# Patient Record
Sex: FEMALE | Race: WHITE | NOT HISPANIC OR LATINO | Employment: FULL TIME | URBAN - METROPOLITAN AREA
[De-identification: names, ages, dates, MRNs, and addresses within clinical notes are randomized per-mention and may not be internally consistent; named-entity substitution may affect disease eponyms.]

---

## 2023-10-26 ENCOUNTER — OFFICE VISIT (OUTPATIENT)
Dept: FAMILY MEDICINE CLINIC | Facility: CLINIC | Age: 63
End: 2023-10-26
Payer: COMMERCIAL

## 2023-10-26 VITALS
WEIGHT: 142 LBS | RESPIRATION RATE: 18 BRPM | BODY MASS INDEX: 22.29 KG/M2 | TEMPERATURE: 99.1 F | HEIGHT: 67 IN | HEART RATE: 89 BPM | OXYGEN SATURATION: 100 % | DIASTOLIC BLOOD PRESSURE: 72 MMHG | SYSTOLIC BLOOD PRESSURE: 152 MMHG

## 2023-10-26 DIAGNOSIS — Z13.29 SCREENING FOR THYROID DISORDER: ICD-10-CM

## 2023-10-26 DIAGNOSIS — I10 ESSENTIAL HYPERTENSION: ICD-10-CM

## 2023-10-26 DIAGNOSIS — R60.0 LOWER EXTREMITY EDEMA: ICD-10-CM

## 2023-10-26 DIAGNOSIS — Z13.1 SCREENING FOR DIABETES MELLITUS: ICD-10-CM

## 2023-10-26 DIAGNOSIS — Z13.0 SCREENING FOR DEFICIENCY ANEMIA: ICD-10-CM

## 2023-10-26 DIAGNOSIS — Z23 ENCOUNTER FOR IMMUNIZATION: ICD-10-CM

## 2023-10-26 DIAGNOSIS — Z12.31 ENCOUNTER FOR SCREENING MAMMOGRAM FOR MALIGNANT NEOPLASM OF BREAST: ICD-10-CM

## 2023-10-26 DIAGNOSIS — Z00.00 ANNUAL PHYSICAL EXAM: Primary | ICD-10-CM

## 2023-10-26 DIAGNOSIS — Z13.6 SCREENING FOR CARDIOVASCULAR CONDITION: ICD-10-CM

## 2023-10-26 DIAGNOSIS — H61.91 SKIN LESION OF RIGHT EAR: ICD-10-CM

## 2023-10-26 PROCEDURE — 90715 TDAP VACCINE 7 YRS/> IM: CPT

## 2023-10-26 PROCEDURE — 90471 IMMUNIZATION ADMIN: CPT

## 2023-10-26 PROCEDURE — 99214 OFFICE O/P EST MOD 30 MIN: CPT | Performed by: FAMILY MEDICINE

## 2023-10-26 PROCEDURE — 99386 PREV VISIT NEW AGE 40-64: CPT | Performed by: FAMILY MEDICINE

## 2023-10-26 RX ORDER — TRIAMCINOLONE ACETONIDE 1 MG/G
CREAM TOPICAL 2 TIMES DAILY
Qty: 30 G | Refills: 1 | Status: SHIPPED | OUTPATIENT
Start: 2023-10-26

## 2023-10-26 RX ORDER — TRIAMTERENE AND HYDROCHLOROTHIAZIDE 37.5; 25 MG/1; MG/1
1 CAPSULE ORAL DAILY
Qty: 30 CAPSULE | Refills: 5 | Status: SHIPPED | OUTPATIENT
Start: 2023-10-26 | End: 2024-04-23

## 2023-10-26 NOTE — PATIENT INSTRUCTIONS
Wellness Visit for Adults   AMBULATORY CARE:   A wellness visit  is when you see your healthcare provider to get screened for health problems. Your healthcare provider will also give you advice on how to stay healthy. Write down your questions so you remember to ask them. Ask your healthcare provider how often you should have a wellness visit. What happens at a wellness visit:  Your healthcare provider will ask about your health, and your family history of health problems. This includes high blood pressure, heart disease, and cancer. He or she will ask if you have symptoms that concern you, if you smoke, and about your mood. You may also be asked about your intake of medicines, supplements, food, and alcohol. Any of the following may be done: Your weight  will be checked. Your height may also be checked so your body mass index (BMI) can be calculated. Your BMI shows if you are at a healthy weight. Your blood pressure  and heart rate will be checked. Your temperature may also be checked. Blood and urine tests  may be done. Blood tests may be done to check your cholesterol levels. Abnormal cholesterol levels increase your risk for heart disease and stroke. You may also need a blood or urine test to check for diabetes if you are at increased risk. Urine tests may be done to look for signs of an infection or kidney disease. A physical exam  includes checking your heartbeat and lungs with a stethoscope. Your healthcare provider may also check your skin to look for sun damage. Screening tests  may be recommended. A screening test is done to check for diseases that may not cause symptoms. The screening tests you may need depend on your age, gender, family history, and lifestyle habits. For example, colorectal screening may be recommended if you are 48years old or older. Screening tests you need if you are a woman:   A Pap smear  is used to screen for cervical cancer.  Pap smears are usually done every 3 to 5 years depending on your age. You may need them more often if you have had abnormal Pap smear test results in the past. Ask your healthcare provider how often you should have a Pap smear. A mammogram  is an x-ray of your breasts to screen for breast cancer. Experts recommend mammograms every 2 years starting at age 48 years. You may need a mammogram at age 52 years or younger if you have an increased risk for breast cancer. Talk to your healthcare provider about when you should start having mammograms and how often you need them. Vaccines you may need:   Get an influenza vaccine  every year. The influenza vaccine protects you from the flu. Several types of viruses cause the flu. The viruses change over time, so new vaccines are made each year. Get a tetanus-diphtheria (Td) booster vaccine  every 10 years. This vaccine protects you against tetanus and diphtheria. Tetanus is a severe infection that may cause painful muscle spasms and lockjaw. Diphtheria is a severe bacterial infection that causes a thick covering in the back of your mouth and throat. Get a human papillomavirus (HPV) vaccine  if you are female and aged 23 to 32 or male 23 to 24 and never received it. This vaccine protects you from HPV infection. HPV is the most common infection spread by sexual contact. HPV may also cause vaginal, penile, and anal cancers. Get a pneumococcal vaccine  if you are aged 72 years or older. The pneumococcal vaccine is an injection given to protect you from pneumococcal disease. Pneumococcal disease is an infection caused by pneumococcal bacteria. The infection may cause pneumonia, meningitis, or an ear infection. Get a shingles vaccine  if you are 60 or older, even if you have had shingles before. The shingles vaccine is an injection to protect you from the varicella-zoster virus. This is the same virus that causes chickenpox.  Shingles is a painful rash that develops in people who had chickenpox or have been exposed to the virus. How to eat healthy:  My Plate is a model for planning healthy meals. It shows the types and amounts of foods that should go on your plate. Fruits and vegetables make up about half of your plate, and grains and protein make up the other half. A serving of dairy is included on the side of your plate. The amount of calories and serving sizes you need depends on your age, gender, weight, and height. Examples of healthy foods are listed below:  Eat a variety of vegetables  such as dark green, red, and orange vegetables. You can also include canned vegetables low in sodium (salt) and frozen vegetables without added butter or sauces. Eat a variety of fresh fruits , canned fruit in 100% juice, frozen fruit, and dried fruit. Include whole grains. At least half of the grains you eat should be whole grains. Examples include whole-wheat bread, wheat pasta, brown rice, and whole-grain cereals such as oatmeal.    Eat a variety of protein foods such as seafood (fish and shellfish), lean meat, and poultry without skin (turkey and chicken). Examples of lean meats include pork leg, shoulder, or tenderloin, and beef round, sirloin, tenderloin, and extra lean ground beef. Other protein foods include eggs and egg substitutes, beans, peas, soy products, nuts, and seeds. Choose low-fat dairy products such as skim or 1% milk or low-fat yogurt, cheese, and cottage cheese. Limit unhealthy fats  such as butter, hard margarine, and shortening. Exercise:  Exercise at least 30 minutes per day on most days of the week. Some examples of exercise include walking, biking, dancing, and swimming. You can also fit in more physical activity by taking the stairs instead of the elevator or parking farther away from stores. Include muscle strengthening activities 2 days each week. Regular exercise provides many health benefits.  It helps you manage your weight, and decreases your risk for type 2 diabetes, heart disease, stroke, and high blood pressure. Exercise can also help improve your mood. Ask your healthcare provider about the best exercise plan for you. General health and safety guidelines:   Do not smoke. Nicotine and other chemicals in cigarettes and cigars can cause lung damage. Ask your healthcare provider for information if you currently smoke and need help to quit. E-cigarettes or smokeless tobacco still contain nicotine. Talk to your healthcare provider before you use these products. Limit alcohol. A drink of alcohol is 12 ounces of beer, 5 ounces of wine, or 1½ ounces of liquor. Lose weight, if needed. Being overweight increases your risk of certain health conditions. These include heart disease, high blood pressure, type 2 diabetes, and certain types of cancer. Protect your skin. Do not sunbathe or use tanning beds. Use sunscreen with a SPF 15 or higher. Apply sunscreen at least 15 minutes before you go outside. Reapply sunscreen every 2 hours. Wear protective clothing, hats, and sunglasses when you are outside. Drive safely. Always wear your seatbelt. Make sure everyone in your car wears a seatbelt. A seatbelt can save your life if you are in an accident. Do not use your cell phone when you are driving. This could distract you and cause an accident. Pull over if you need to make a call or send a text message. Practice safe sex. Use latex condoms if are sexually active and have more than one partner. Your healthcare provider may recommend screening tests for sexually transmitted infections (STIs). Wear helmets, lifejackets, and protective gear. Always wear a helmet when you ride a bike or motorcycle, go skiing, or play sports that could cause a head injury. Wear protective equipment when you play sports. Wear a lifejacket when you are on a boat or doing water sports.     © Copyright Yuly Albabraham 2023 Information is for End User's use only and may not be sold, redistributed or otherwise used for commercial purposes. The above information is an  only. It is not intended as medical advice for individual conditions or treatments. Talk to your doctor, nurse or pharmacist before following any medical regimen to see if it is safe and effective for you. It was nice to meet you today Vanna Cole! Please get your labs done fasting as ordered and you will be notified with results. Start the dyazide for your blood pressure. It is taken once daily. This should also help with your swelling as well. Get some compression stockings   Return in 1 month for a blood pressure check, review of labs and pap. Get echo and mammogram done as ordered as well.

## 2023-10-26 NOTE — PROGRESS NOTES
[facility] PRIMARY CARE    NAME: Michelle Carmona  AGE: 58 y.o. SEX: female  : 1960     DATE: 10/26/2023     Assessment and Plan:     Problem List Items Addressed This Visit          Other    Lower extremity edema    Relevant Medications    triamterene-hydrochlorothiazide (DYAZIDE) 37.5-25 mg per capsule    Other Relevant Orders    Urinalysis with microscopic    Echo complete w/ contrast if indicated    Compression Stocking  Discussed all of the potential reasons for lower extremity edema  Suspect that her swelling is most likely due to venous stasis though legs looked good in office today with really no edema. Check echo     Other Visit Diagnoses       Annual physical exam    -  Primary  Discussed diet and exercise. Screening labs ordered  She declines HIV and Hep C screening   Has never had shingrix. Adacel given in office today  She declines flu vaccine. Mammogram ordered  Cervical cancer screen is not up to date. Will schedule here. She states that colon cancer screen is up to date. Had at [facility]. Sent request to care gap team to get result for her chart.      Encounter for screening mammogram for malignant neoplasm of breast        Relevant Orders    Mammo screening bilateral w cad    Screening for cardiovascular condition        Relevant Orders    Lipid panel    Screening for diabetes mellitus        Relevant Orders    Comprehensive metabolic panel    Screening for thyroid disorder        Relevant Orders    TSH, 3rd generation with Free T4 reflex    Screening for deficiency anemia        Relevant Orders    CBC and differential    BMI 22.0-22.9, adult        Encounter for immunization        Relevant Orders    TDAP VACCINE GREATER THAN OR EQUAL TO 8YO IM (Completed)    Essential hypertension        Relevant Medications    triamterene-hydrochlorothiazide (DYAZIDE) 37.5-25 mg per capsule    Other Relevant Orders    Echo complete w/ contrast if indicated  Bp elevated in office today. Was unable to do EKG in office today as our machine is broken. Will get echo to rule out any LVH. Start dyazide once daily  Check labs. Recheck bp in 1 month    Skin lesion of right ear        Relevant Medications    triamcinolone (KENALOG) 0.1 % cream    Other Relevant Orders    Ambulatory Referral to Dermatology  Skin on ear is dry and will give her rx for triamcinolone cream.   Concerned because there is also an area of scabbing that does not seem to be healing so want to be sure this is not some pre-cancerous lesion. Referral to derm placed. Immunizations and preventive care screenings were discussed with patient today. Appropriate education was printed on patient's after visit summary. Counseling:  Alcohol/drug use: discussed moderation in alcohol intake, the recommendations for healthy alcohol use, and avoidance of illicit drug use. Dental Health: discussed importance of regular tooth brushing, flossing, and dental visits. Injury prevention: discussed safety/seat belts, safety helmets, smoke detectors, carbon dioxide detectors, and smoking near bedding or upholstery. Sexual health: discussed sexually transmitted diseases, partner selection, use of condoms, avoidance of unintended pregnancy, and contraceptive alternatives. Exercise: the importance of regular exercise/physical activity was discussed. Recommend exercise 3-5 times per week for at least 30 minutes. Depression Screening and Follow-up Plan: Patient was screened for depression during today's encounter. They screened negative with a PHQ-2 score of 0. Return in about 4 weeks (around 11/23/2023) for Recheck. Chief Complaint:     Chief Complaint   Patient presents with   • Establish Care     Patient is in the office to Establish Care with Dr. Terese Rodriguez. PHQ-9, [place]-7, BMI Plan due.    Past PCP - Dr Anil Agarwal   Stated concerns -Patient Needs labs have not been to doctors in 2 years she also has leg swelling she will like to discuss and might need to see dermo for a rash on her ear   Annual - overdue  PAP -due   Mammo - Due  DXA-Due  [address]   Tdap - Patient will like  to discuss   Flu shot - Patient declines   HEP C / HIV SCREEN -  Lab - [facility]       History of Present Illness:     Adult Annual Physical   Patient here as new patient to this office for a comprehensive physical exam.     She previously saw Dr. Carmen Lovelace at the [facility] office but there are no records of her visits in [facility] so it has likely been years (prior to [facility]'s going on [facility]) that she was there. Is overdue for mammogram.   Cervical cancer screening overdue. No history of abnormal paps. Agrees to schedule. Colon cancer screening was around 5 years ago. Done at [facility]. Immunizations---shingrix, flu, RSV, covid, adacel. Declines flu vaccine. Agreeable to adacel. She has not had shingrix. Wants labs. Has swelling in her legs. Ongoing for years. Worse in summer months. Swelling improves overnight and as the day goes on the swell up. Sister gave her some of her dyazide to try. Wonders if she could have some. She also has dryness of right ear. Had some scabbing. Some pain in right side of abdomen when she bends or moves. Feels "tight". Really only bothers her when she bends over. Diet and Physical Activity  Diet/Nutrition: well balanced diet. Exercise: no formal exercise.       Depression Screening  PHQ-2/9 Depression Screening    Little interest or pleasure in doing things: 0 - not at all  Feeling down, depressed, or hopeless: 0 - not at all  Trouble falling or staying asleep, or sleeping too much: 0 - not at all  Feeling tired or having little energy: 0 - not at all  Poor appetite or overeatin - not at all  Feeling bad about yourself - or that you are a failure or have let yourself or your family down: 0 - not at all  Trouble concentrating on things, such as reading the newspaper or watching television: 0 - not at all  Moving or speaking so slowly that other people could have noticed. Or the opposite - being so fidgety or restless that you have been moving around a lot more than usual: 0 - not at all  Thoughts that you would be better off dead, or of hurting yourself in some way: 0 - not at all  PHQ-2 Score: 0  PHQ-2 Interpretation: Negative depression screen  PHQ-9 Score: 0   PHQ-9 Interpretation: No or Minimal depression        General Health  Sleep: sleeps well. Hearing: decreased - bilateral.  Vision: no vision problems and wears glasses. Sees eye doctor. Dental: regular dental visits. /GYN Health  Patient is: postmenopausal      Advanced Care Planning  Do you have an advanced directive? no  Do you have a durable medical power of ? no     Review of Systems:     Review of Systems   Past Medical History:     History reviewed. No pertinent past medical history. Past Surgical History:     History reviewed. No pertinent surgical history. Social History:     Social History     Socioeconomic History   • Marital status: Single     Spouse name: None   • Number of children: None   • Years of education: None   • Highest education level: None   Occupational History   • None   Tobacco Use   • Smoking status: Former     Packs/day: 0.50     Years: 12.00     Total pack years: 6.00     Types: Cigarettes     Start date: 1977     Quit date: 1989     Years since quittin.3   • Smokeless tobacco: Never   Vaping Use   • Vaping Use: Never used   Substance and Sexual Activity   • Alcohol use:  Yes     Alcohol/week: 14.0 standard drinks of alcohol     Types: 14 Glasses of wine per week   • Drug use: Never   • Sexual activity: Not Currently     Partners: Male   Other Topics Concern   • None   Social History Narrative    Single    One daughter    Works doing [employer]     Social Determinants of Health     Financial Resource Strain: Not on file Food Insecurity: Not on file   Transportation Needs: Not on file   Physical Activity: Not on file   Stress: Not on file   Social Connections: Not on file   Intimate Partner Violence: Not on file   Housing Stability: Not on file      Family History:     Family History   Problem Relation Age of Onset   • Hodgkin's lymphoma Mother    • Heart disease Father    • Thyroid disease Sister       Current Medications:     Current Outpatient Medications   Medication Sig Dispense Refill   • nicotine polacrilex (NICORETTE) 4 mg gum Chew 2 mg as needed for smoking cessation     • triamcinolone (KENALOG) 0.1 % cream Apply topically 2 (two) times a day 30 g 1   • triamterene-hydrochlorothiazide (DYAZIDE) 37.5-25 mg per capsule Take 1 capsule by mouth daily 30 capsule 5     No current facility-administered medications for this visit. Allergies:     No Known Allergies   Physical Exam:     /72   Pulse 89   Temp 99.1 °F (37.3 °C) (Tympanic)   Resp 18   Ht 5' 6.5" (1.689 m)   Wt 64.4 kg (142 lb)   SpO2 100%   BMI 22.58 kg/m²     Physical Exam  Vitals and nursing note reviewed. Constitutional:       General: She is not in acute distress. Appearance: Normal appearance. She is not ill-appearing, toxic-appearing or diaphoretic. HENT:      Head: Normocephalic and atraumatic. Right Ear: Tympanic membrane, ear canal and external ear normal.      Left Ear: Tympanic membrane and ear canal normal.      Ears:      Comments: Pinna with some dryness and peeling of external ear     Mouth/Throat:      Mouth: Mucous membranes are moist.      Pharynx: No oropharyngeal exudate or posterior oropharyngeal erythema. Eyes:      Extraocular Movements: Extraocular movements intact. Conjunctiva/sclera: Conjunctivae normal.      Pupils: Pupils are equal, round, and reactive to light. Neck:      Vascular: No carotid bruit. Cardiovascular:      Rate and Rhythm: Normal rate and regular rhythm. Heart sounds:  No murmur heard. Comments: Some varicosities bilateral lower extremities  Pulmonary:      Effort: Pulmonary effort is normal.      Breath sounds: Normal breath sounds. Abdominal:      General: Abdomen is flat. Bowel sounds are normal.      Palpations: Abdomen is soft. Musculoskeletal:      Cervical back: Normal range of motion and neck supple. Right lower leg: No edema. Left lower leg: No edema. Lymphadenopathy:      Cervical: No cervical adenopathy. Skin:     General: Skin is warm and dry. Findings: No rash. Neurological:      General: No focal deficit present. Mental Status: She is alert and oriented to person, place, and time.       Gait: Gait normal.   Psychiatric:         Mood and Affect: Mood normal.        Sammi Braden, DO  [address] PRIMARY CARE

## 2023-10-27 ENCOUNTER — TELEPHONE (OUTPATIENT)
Dept: ADMINISTRATIVE | Facility: OTHER | Age: 63
End: 2023-10-27

## 2023-10-27 ENCOUNTER — TELEPHONE (OUTPATIENT)
Dept: FAMILY MEDICINE CLINIC | Facility: CLINIC | Age: 63
End: 2023-10-27

## 2023-10-27 NOTE — TELEPHONE ENCOUNTER
----- Message from Lisette Bahena DO sent at 10/26/2023  4:10 PM EDT -----  Regarding: care gap request  11/18/20 3:57 PM    Hello, our patient attached above has had CRC: Colonoscopy completed/performed. Please assist in updating the patient chart by making an External outreach to [facility] located in [place]. The date of service is ???.     Thank you,  Lisette Bahena DO  [facility]

## 2023-10-27 NOTE — TELEPHONE ENCOUNTER
Left detailed message to get mammogram done and appointment details.      [facility]  [address], [place], [place], [address]   12/19/2023  1:00pm

## 2023-10-27 NOTE — TELEPHONE ENCOUNTER
Upon review of the In Basket request we were able to note that no further action is required. The patient chart is up to date. Patient due for recall 11/23. Any additional questions or concerns should be emailed to the Practice Liaisons via the appropriate education email address, please do not reply via In Basket.     Thank you  Enriqueta Aguayo

## 2023-11-08 PROBLEM — R73.01 IMPAIRED FASTING GLUCOSE: Status: ACTIVE | Noted: 2023-11-08

## 2023-11-08 PROBLEM — E78.2 MIXED HYPERLIPIDEMIA: Status: ACTIVE | Noted: 2023-11-08

## 2023-11-08 LAB
ALBUMIN SERPL-MCNC: 4.8 G/DL (ref 3.9–4.9)
ALBUMIN/GLOB SERPL: 1.8 {RATIO} (ref 1.2–2.2)
ALP SERPL-CCNC: 56 IU/L (ref 44–121)
ALT SERPL-CCNC: 17 IU/L (ref 0–32)
AST SERPL-CCNC: 27 IU/L (ref 0–40)
BASOPHILS # BLD AUTO: 0 X10E3/UL (ref 0–0.2)
BASOPHILS NFR BLD AUTO: 0 %
BILIRUB SERPL-MCNC: 0.5 MG/DL (ref 0–1.2)
BUN SERPL-MCNC: 20 MG/DL (ref 8–27)
BUN/CREAT SERPL: 22 (ref 12–28)
CALCIUM SERPL-MCNC: 9.7 MG/DL (ref 8.7–10.3)
CHLORIDE SERPL-SCNC: 94 MMOL/L (ref 96–106)
CHOLEST SERPL-MCNC: 271 MG/DL (ref 100–199)
CHOLEST/HDLC SERPL: 3.3 RATIO (ref 0–4.4)
CO2 SERPL-SCNC: 27 MMOL/L (ref 20–29)
CREAT SERPL-MCNC: 0.91 MG/DL (ref 0.57–1)
EGFR: 70 ML/MIN/1.73
EOSINOPHIL # BLD AUTO: 0.1 X10E3/UL (ref 0–0.4)
EOSINOPHIL NFR BLD AUTO: 2 %
ERYTHROCYTE [DISTWIDTH] IN BLOOD BY AUTOMATED COUNT: 12.2 % (ref 11.7–15.4)
GLOBULIN SER-MCNC: 2.6 G/DL (ref 1.5–4.5)
GLUCOSE SERPL-MCNC: 106 MG/DL (ref 70–99)
HCT VFR BLD AUTO: 36.3 % (ref 34–46.6)
HDLC SERPL-MCNC: 83 MG/DL
HGB BLD-MCNC: 12.3 G/DL (ref 11.1–15.9)
IMM GRANULOCYTES # BLD: 0 X10E3/UL (ref 0–0.1)
IMM GRANULOCYTES NFR BLD: 0 %
LDLC SERPL CALC-MCNC: 162 MG/DL (ref 0–99)
LYMPHOCYTES # BLD AUTO: 1 X10E3/UL (ref 0.7–3.1)
LYMPHOCYTES NFR BLD AUTO: 18 %
MCH RBC QN AUTO: 32.5 PG (ref 26.6–33)
MCHC RBC AUTO-ENTMCNC: 33.9 G/DL (ref 31.5–35.7)
MCV RBC AUTO: 96 FL (ref 79–97)
MONOCYTES # BLD AUTO: 0.7 X10E3/UL (ref 0.1–0.9)
MONOCYTES NFR BLD AUTO: 12 %
NEUTROPHILS # BLD AUTO: 3.7 X10E3/UL (ref 1.4–7)
NEUTROPHILS NFR BLD AUTO: 68 %
PLATELET # BLD AUTO: 328 X10E3/UL (ref 150–450)
POTASSIUM SERPL-SCNC: 3.5 MMOL/L (ref 3.5–5.2)
PROT SERPL-MCNC: 7.4 G/DL (ref 6–8.5)
RBC # BLD AUTO: 3.79 X10E6/UL (ref 3.77–5.28)
SL AMB VLDL CHOLESTEROL CALC: 26 MG/DL (ref 5–40)
SODIUM SERPL-SCNC: 139 MMOL/L (ref 134–144)
TRIGL SERPL-MCNC: 150 MG/DL (ref 0–149)
TSH SERPL DL<=0.005 MIU/L-ACNC: 2.76 UIU/ML (ref 0.45–4.5)
WBC # BLD AUTO: 5.5 X10E3/UL (ref 3.4–10.8)

## 2023-11-28 ENCOUNTER — OFFICE VISIT (OUTPATIENT)
Dept: FAMILY MEDICINE CLINIC | Facility: CLINIC | Age: 63
End: 2023-11-28
Payer: COMMERCIAL

## 2023-11-28 VITALS
OXYGEN SATURATION: 100 % | RESPIRATION RATE: 18 BRPM | SYSTOLIC BLOOD PRESSURE: 130 MMHG | HEIGHT: 66 IN | TEMPERATURE: 97.5 F | BODY MASS INDEX: 22.53 KG/M2 | DIASTOLIC BLOOD PRESSURE: 72 MMHG | HEART RATE: 83 BPM | WEIGHT: 140.2 LBS

## 2023-11-28 DIAGNOSIS — Z86.010 HISTORY OF COLON POLYPS: ICD-10-CM

## 2023-11-28 DIAGNOSIS — E78.2 MIXED HYPERLIPIDEMIA: ICD-10-CM

## 2023-11-28 DIAGNOSIS — R60.0 LOWER EXTREMITY EDEMA: ICD-10-CM

## 2023-11-28 DIAGNOSIS — Z12.11 COLON CANCER SCREENING: ICD-10-CM

## 2023-11-28 DIAGNOSIS — R73.01 IMPAIRED FASTING GLUCOSE: ICD-10-CM

## 2023-11-28 DIAGNOSIS — Z01.419 ENCOUNTER FOR GYNECOLOGICAL EXAMINATION: ICD-10-CM

## 2023-11-28 DIAGNOSIS — Z12.4 CERVICAL CANCER SCREENING: ICD-10-CM

## 2023-11-28 DIAGNOSIS — I10 ESSENTIAL HYPERTENSION: Primary | ICD-10-CM

## 2023-11-28 PROCEDURE — 99386 PREV VISIT NEW AGE 40-64: CPT | Performed by: FAMILY MEDICINE

## 2023-11-28 NOTE — PATIENT INSTRUCTIONS
Heart Healthy Diet   WHAT YOU NEED TO KNOW:   A heart healthy diet is an eating plan low in unhealthy fats and sodium (salt). The plan is high in healthy fats and fiber. A heart healthy diet helps improve your cholesterol levels and lowers your risk for heart disease and stroke. A dietitian will teach you how to read and understand food labels. DISCHARGE INSTRUCTIONS:   Heart healthy diet guidelines to follow:   Choose foods that contain healthy fats:      Unsaturated fats  include monounsaturated and polyunsaturated fats. Unsaturated fat is found in foods such as soybean, canola, olive, corn, and safflower oils. It is also found in soft tub margarine that is made with liquid vegetable oil. Omega-3 fat  is found in certain fish, such as salmon, tuna, and trout, and in walnuts and flaxseed. Eat fish high in omega-3 fats at least 2 times a week. Limit or do not have unhealthy fats:      Cholesterol  is found in animal foods, such as eggs and lobster, and in dairy products made from whole milk. Limit cholesterol to less than 200 mg each day. Saturated fat  is found in meats, such as hathaway and hamburger. It is also found in chicken or turkey skin, whole milk, and butter. Limit saturated fat to less than 7% of your total daily calories. Trans fat  is found in packaged foods, such as potato chips and cookies. It is also in hard margarine, some fried foods, and shortening. Do not eat foods that contain trans fats. Get 20 to 30 grams of fiber each day. Fruits, vegetables, whole-grain foods, and legumes (cooked beans) are good sources of fiber. Limit sodium as directed. You may be told to limit sodium, such as to 2,000 mg or less each day. Choose low-sodium or no-salt-added foods. Add little or no salt to food you prepare. Use herbs and spices in place of salt.        Include the following in your heart healthy plan:  Ask your dietitian or healthcare provider how many servings to have each day from the following food groups:  Grains:      Whole-wheat breads, cereals, and pastas, and brown rice    Low-fat, low-sodium crackers and chips    Vegetables:      Broccoli, green beans, green peas, and spinach    Collards, kale, and lima beans    Carrots, sweet potatoes, tomatoes, and peppers    Canned vegetables with no salt added    Fruits:      Bananas, peaches, pears, and pineapple    Grapes, raisins, and dates    Oranges, tangerines, grapefruit, orange juice, and grapefruit juice    Apricots, mangoes, melons, and papaya    Raspberries and strawberries    Canned fruit with no added sugar    Low-fat dairy:      Nonfat (skim) milk, 1% milk, and low-fat almond, cashew, or soy milks fortified with calcium    Low-fat cheese, regular or frozen yogurt, and cottage cheese    Meats and proteins:      Lean cuts of beef and pork (loin, leg, round), skinless chicken and turkey    Legumes, soy products, egg whites, or nuts    Limit or do not include the following in your heart healthy plan:   Foods and liquids that contain unhealthy fats and oils:      Whole or 2% milk, cream cheese, sour cream, or cheese    High-fat cuts of beef (T-bone steaks, ribs), chicken or turkey with skin, and organ meats such as liver    Butter, stick margarine, shortening, and cooking oils such as coconut or palm oil    Foods and liquids high in sodium:      Packaged foods, such as frozen dinners, cookies, macaroni and cheese, and cereals with more than 300 mg of sodium per serving    Vegetables with added sodium, such as instant potatoes, vegetables with added sauces, or regular canned vegetables    Cured or smoked meats, such as hot dogs, hathaway, and sausage    High-sodium ketchup, barbecue sauce, salad dressing, pickles, olives, soy sauce, or miso    Foods and liquids high in sugar:      Candy, cake, cookies, pies, or doughnuts    Soft drinks (soda), sports drinks, or sweetened tea    Canned or dry mixes for cakes, soups, sauces, or gravies    Other healthy heart guidelines:   Do not smoke. Nicotine and other chemicals in cigarettes and cigars can cause lung and heart damage. Ask your healthcare provider for information if you currently smoke and need help to quit. E-cigarettes or smokeless tobacco still contain nicotine. Talk to your provider before you use these products. Limit or do not drink alcohol as directed. Alcohol can damage your heart and raise your blood pressure. Your provider may give you specific daily and weekly limits. The general recommended limit is 1 drink a day for women 21 or older and for men 72 or older. Do not have more than 3 drinks within 24 hours or 7 within a week. The recommended limit is 2 drinks a day for men 24to 59years of age. Do not have more than 4 drinks within 24 hours or 14 within a week. A drink of alcohol is 12 ounces of beer, 5 ounces of wine, or 1½ ounces of liquor. Maintain a healthy weight. Extra body weight makes your heart work harder. Ask your provider what a healthy weight is for you. He or she can help you create a safe weight loss plan, if needed. Exercise regularly. Exercise can help you maintain a healthy weight and improve your blood pressure and cholesterol levels. Regular exercise can also decrease your risk for heart problems. Ask your provider about the best exercise plan for you. Do not start an exercise program without asking your provider. Follow up with your doctor or cardiologist as directed:  Write down your questions so you remember to ask them during your visits. © Copyright Rosemary Mar 2023 Information is for End User's use only and may not be sold, redistributed or otherwise used for commercial purposes. The above information is an  only. It is not intended as medical advice for individual conditions or treatments. Talk to your doctor, nurse or pharmacist before following any medical regimen to see if it is safe and effective for you.   Low Fat Diet   WHAT YOU NEED TO KNOW:   A low-fat diet is an eating plan that is low in total fat, unhealthy fat, and cholesterol. You may need to follow a low-fat diet if you have trouble digesting or absorbing fat. You may also need to follow this diet if you have high cholesterol. You can also lower your cholesterol by increasing the amount of fiber in your diet. Soluble fiber is a type of fiber that helps to decrease cholesterol levels. DISCHARGE INSTRUCTIONS:   Different types of fat in food:   Limit unhealthy fats. A diet that is high in cholesterol, saturated fat, and trans fat may cause unhealthy cholesterol levels. Unhealthy cholesterol levels increase your risk of heart disease. Cholesterol:  Limit intake of cholesterol to less than 200 mg per day. Cholesterol is found in meat, eggs, and dairy. Saturated fat:  Limit saturated fat to less than 7% of your total daily calories. Ask your dietitian how many calories you need each day. Saturated fat is found in butter, cheese, ice cream, whole milk, and palm oil. Saturated fat is also found in meat, such as beef, pork, chicken skin, and processed meats. Processed meats include sausage, hot dogs, and bologna. Trans fat:  Avoid trans fat as much as possible. Trans fat is used in fried and baked foods. Foods that say trans fat free on the label may still have up to 0.5 grams of trans fat per serving. Include healthy fats. Replace foods that are high in saturated and trans fat with foods high in healthy fats. This may help to decrease high cholesterol levels. Monounsaturated fats: These are found in avocados, nuts, and vegetable oils, such as olive, canola, and sunflower oil. Polyunsaturated fats: These can be found in vegetable oils, such as soybean or corn oil. Omega-3 fats can help to decrease the risk of heart disease. Omega-3 fats are found in fish, such as salmon, herring, trout, and tuna.  Omega-3 fats can also be found in plant foods, such as walnuts, flaxseed, soybeans, and canola oil. Foods to limit or avoid:   Grains:      Snacks that are made with partially hydrogenated oils, such as chips, regular crackers, and butter-flavored popcorn    High-fat baked goods, such as biscuits, croissants, doughnuts, pies, cookies, and pastries    Dairy:      Whole milk, 2% milk, and yogurt and ice cream made with whole milk    Half and half creamer, heavy cream, and whipping cream    Cheese, cream cheese, and sour cream    Meats and proteins:      High-fat cuts of meat (T-bone steak, regular hamburger, and ribs)    [facility], poultry (turkey and chicken), and fish    Poultry (chicken and turkey) with skin    Cold cuts (salami or bologna), hot dogs, hathaway, and sausage    Whole eggs and egg yolks    Vegetables and fruits with added fat:      Fried vegetables or vegetables in butter or high-fat sauces, such as cream or cheese sauces    Fried fruit or fruit served with butter or cream    Fats:      Butter, stick margarine, and shortening    Coconut, palm oil, and palm kernel oil    Foods to include:       Grains:      Whole-grain breads, cereals, pasta, and brown rice    Low-fat crackers and pretzels    Vegetables and fruits:      Fresh, frozen, or canned vegetables (no salt or low-sodium)    Fresh, frozen, dried, or canned fruit (canned in light syrup or fruit juice)    Avocado    Low-fat dairy products:      Nonfat (skim) or 1% milk    Nonfat or low-fat cheese, yogurt, and cottage cheese    Meats and proteins:      Chicken or turkey with no skin    Baked or broiled fish    Lean beef and pork (loin, round, extra lean hamburger)    Beans and peas, unsalted nuts, soy products    Egg whites and substitutes    Seeds and nuts    Fats:      Unsaturated oil, such as canola, olive, peanut, soybean, or sunflower oil    Soft or liquid margarine and vegetable oil spread    Low-fat salad dressing  Other ways to decrease fat:   Read food labels before you buy foods. Choose foods that have less than 30% of calories from fat. Choose low-fat or fat-free dairy products. Remember that fat free does not mean calorie free. These foods still contain calories, and too many calories can lead to weight gain. Trim fat from meat and avoid fried food. Trim all visible fat from meat before you cook it. Remove the skin from poultry. Do not bernstein meat, fish, or poultry. Bake, roast, boil, or broil these foods instead. Avoid fried foods. Eat a baked potato instead of [place] fries. Steam vegetables instead of sautéing them in butter. Add less fat to foods. Use imitation hathaway bits on salads and baked potatoes instead of regular hathaway bits. Use fat-free or low-fat salad dressings instead of regular dressings. Use low-fat or nonfat butter-flavored topping instead of regular butter or margarine on popcorn and other foods. Ways to decrease fat in recipes:  Replace high-fat ingredients with low-fat or nonfat ones. This may cause baked goods to be drier than usual. You may need to use nonfat cooking spray on pans to prevent food from sticking. You also may need to change the amount of other ingredients, such as water, in the recipe. Try the following:  Use low-fat or light margarine instead of regular margarine or shortening. Use lean ground turkey breast or chicken, or lean ground beef (less than 5% fat) instead of hamburger. Add 1 teaspoon of canola oil to 8 ounces of skim milk instead of using cream or half and half. Use grated zucchini, carrots, or apples in breads instead of coconut. Use blenderized, low-fat cottage cheese, plain tofu, or low-fat ricotta cheese instead of cream cheese. Use 1 egg white and 1 teaspoon of canola oil, or use ¼ cup (2 ounces) of fat-free egg substitute instead of a whole egg. Replace half of the oil that is called for in a recipe with applesauce when you bake.  Use 3 tablespoons of cocoa powder and 1 tablespoon of canola oil instead of a square of baking chocolate. How to increase fiber:  Eat enough high-fiber foods to get 20 to 30 grams of fiber every day. Slowly increase your fiber intake to avoid stomach cramps, gas, and other problems. Eat 3 ounces of whole-grain foods each day. An ounce is about 1 slice of bread. Eat whole-grain breads, such as whole-wheat bread. Whole wheat, whole-wheat flour, or other whole grains should be listed as the first ingredient on the food label. Replace white flour with whole-grain flour or use half of each in recipes. Whole-grain flour is heavier than white flour, so you may have to add more yeast or baking powder. Eat a high-fiber cereal for breakfast.  Oatmeal is a good source of soluble fiber. Look for cereals that have bran or fiber in the name. Choose whole-grain products, such as brown rice, barley, and whole-wheat pasta. Eat more beans, peas, and lentils. For example, add beans to soups or salads. Eat at least 5 cups of fruits and vegetables each day. Eat fruits and vegetables with the peel because the peel is high in fiber. © Copyright Rupaa Gosselin 2023 Information is for End User's use only and may not be sold, redistributed or otherwise used for commercial purposes. The above information is an  only. It is not intended as medical advice for individual conditions or treatments. Talk to your doctor, nurse or pharmacist before following any medical regimen to see if it is safe and effective for you.

## 2023-11-28 NOTE — PROGRESS NOTES
Name: Arnette Buerger      : 1960      MRN: [id number]  Encounter Provider: Katiuska Tsai DO  Encounter Date: 2023   Encounter department: [address] [place] [address]     1. Essential hypertension  Bp control improved on dyazide. Continue same. Recheck cmp in 6 months followed by appt  2. Lower extremity edema  Improved. Has not scheduled echo because insurance requires this be done at a stand alone facility. 3. Impaired fasting glucose  -     Comprehensive metabolic panel; Future; Expected date: 2024  -     HEMOGLOBIN A1C W/ EAG ESTIMATION; Future; Expected date: 2024  -     Comprehensive metabolic panel  -     HEMOGLOBIN A1C W/ EAG ESTIMATION  Reviewed labs. Her fasting glucose was elevated at 106. Recommend she decrease consumption of sweets, simple carbs. Recheck 6 months along with a1c  4. Mixed hyperlipidemia  -     Lipid panel; Future; Expected date: 2024  -     Lipid panel  Reviewed lipid panel. Her cardiovascular risk score is elevated at 9.2%. recommend statin therapy but she would prefer to watch diet more closely prior to starting meds  Printed information on mediterranean, heart healthy diet and will recheck lipids in 6 months followed by appt  5. Encounter for gynecological examination  Thin prep pap completed. Will call with results. Mammogram is scheduled  Refer for colonoscopy as she is due   Discussed importance of calcium, vitamin D and weight bearing exercise for bone health. 6. Cervical cancer screening  -     IGP, Aptima HPV, Rfx 16/18,45    7. Colon cancer screening  -     Ambulatory Referral to Gastroenterology; Future    8. History of colon polyps  -     Ambulatory Referral to Gastroenterology; Future        Depression Screening and Follow-up Plan: Patient was screened for depression during today's encounter. They screened negative with a PHQ-2 score of 0.       Subjective     HPI  Patient is a 58year old female with hypertension, hyperlipidemia, impaired fasting glucose here today for pap, for  f/u on her blood pressure and lower extremity edema and review of labs completed on 11/7/23    Stopped menstruating at age 38. Her colonoscopy report was received since her new patient visit. Was completed 11/2/2018 by josh HAYNES. Had descending colon polyp so recommendation was for repeat in 5 years so is due this month. Mammogram is scheduled for 12/19/23. Was started on dyazide at new patient visit on 10/26/23. She is taking as prescribed. Some improvement in swelling. BP elevated when she first arrived in office today which she attributes to being on stressful conference call. She denies any headache, dizziness, chest pain, shortness of breath or palpitations. Ordered echo but this has not been scheduled. Called insurance and was told it had to be completed in a stand alone building. She is not sure where to go? Cholesterol elevated on recent labs. Lab Results   Component Value Date    CHOLESTEROL 271 (H) 11/07/2023     Lab Results   Component Value Date    HDL 83 11/07/2023     Lab Results   Component Value Date    TRIG 150 (H) 11/07/2023     No results found for: "[address]"  The 10-year ASCVD risk score (Karin WEST, et al., 2019) is: 6.8%    Values used to calculate the score:      Age: 58 years      Sex: Female      Is Non- : No      Diabetic: No      Tobacco smoker: No      Systolic Blood Pressure: 978 mmHg      Is BP treated: Yes      HDL Cholesterol: 83 mg/dL      Total Cholesterol: 271 mg/dL    Review of Systems    History reviewed. No pertinent past medical history. History reviewed. No pertinent surgical history.   Family History   Problem Relation Age of Onset   • Hodgkin's lymphoma Mother    • Heart disease Father    • Thyroid disease Sister      Social History     Socioeconomic History   • Marital status: Single     Spouse name: None   • Number of children: None • Years of education: None   • Highest education level: None   Occupational History   • None   Tobacco Use   • Smoking status: Former     Packs/day: 0.50     Years: 12.00     Total pack years: 6.00     Types: Cigarettes     Start date: 1977     Quit date: 1989     Years since quittin.4   • Smokeless tobacco: Never   Vaping Use   • Vaping Use: Never used   Substance and Sexual Activity   • Alcohol use: Yes     Alcohol/week: 14.0 standard drinks of alcohol     Types: 14 Glasses of wine per week   • Drug use: Never   • Sexual activity: Not Currently     Partners: Male   Other Topics Concern   • None   Social History Narrative    Single    One daughter    Works doing [employer] online     Social Determinants of Health     Financial Resource Strain: Not on file   Food Insecurity: Not on file   Transportation Needs: Not on file   Physical Activity: Not on file   Stress: Not on file   Social Connections: Not on file   Intimate Partner Violence: Not on file   Housing Stability: Not on file     Current Outpatient Medications on File Prior to Visit   Medication Sig   • nicotine polacrilex (NICORETTE) 4 mg gum Chew 2 mg as needed for smoking cessation   • triamcinolone (KENALOG) 0.1 % cream Apply topically 2 (two) times a day   • triamterene-hydrochlorothiazide (DYAZIDE) 37.5-25 mg per capsule Take 1 capsule by mouth daily     No Known Allergies  Immunization History   Administered Date(s) Administered   • COVID-19 MODERNA VACC 0.5 ML IM 2021, 10/21/2021   • Tdap 10/26/2023       Objective     /72   Pulse 83   Temp 97.5 °F (36.4 °C) (Tympanic)   Resp 18   Ht 5' 6" (1.676 m)   Wt 63.6 kg (140 lb 3.2 oz)   SpO2 100%   BMI 22.63 kg/m²     Physical Exam  Vitals and nursing note reviewed. Exam conducted with a chaperone present. Constitutional:       General: She is not in acute distress. Appearance: Normal appearance. She is not ill-appearing, toxic-appearing or diaphoretic.    HENT: Head: Normocephalic and atraumatic. Cardiovascular:      Rate and Rhythm: Normal rate and regular rhythm. Heart sounds: No murmur heard. Pulmonary:      Effort: Pulmonary effort is normal.      Breath sounds: Normal breath sounds. Chest:   Breasts:     Right: No swelling, bleeding, inverted nipple, mass, nipple discharge, skin change or tenderness. Left: Normal. No swelling, bleeding, inverted nipple, mass, nipple discharge, skin change or tenderness. Abdominal:      General: Abdomen is flat. There is no distension. Palpations: Abdomen is soft. There is no mass. Tenderness: There is no abdominal tenderness. Genitourinary:     Labia:         Right: No lesion. Left: No lesion. Vagina: Normal. No vaginal discharge, erythema, bleeding or lesions. Cervix: No cervical motion tenderness, friability, lesion or erythema. Uterus: Normal.       Adnexa: Right adnexa normal and left adnexa normal.      Rectum: Normal. Guaiac result negative. Comments: Vagina atrophic. Cervical os stenotic  Musculoskeletal:      Cervical back: Normal range of motion and neck supple. Right lower leg: No edema. Left lower leg: No edema. Lymphadenopathy:      Cervical: No cervical adenopathy. Upper Body:      Right upper body: No axillary adenopathy. Left upper body: No axillary adenopathy. Neurological:      Mental Status: She is alert.    Psychiatric:         Mood and Affect: Mood normal.   Megan Nicholas, DO

## 2023-12-02 LAB
CYTOLOGIST CVX/VAG CYTO: NORMAL
DX ICD CODE: NORMAL
HPV GENOTYPE REFLEX: NORMAL
HPV I/H RISK 4 DNA CVX QL PROBE+SIG AMP: NEGATIVE
OTHER STN SPEC: NORMAL
PATH REPORT.FINAL DX SPEC: NORMAL
SL AMB NOTE:: NORMAL
SL AMB SPECIMEN ADEQUACY: NORMAL
SL AMB TEST METHODOLOGY: NORMAL

## 2023-12-04 ENCOUNTER — TELEPHONE (OUTPATIENT)
Dept: FAMILY MEDICINE CLINIC | Facility: CLINIC | Age: 63
End: 2023-12-04

## 2023-12-04 NOTE — TELEPHONE ENCOUNTER
----- Message from Marlene Cornell DO sent at 12/4/2023  8:20 AM EST -----  Can let patient know that her pap was okay.  No abnormal cells and was negative for HPV

## 2024-01-03 DIAGNOSIS — H61.91 SKIN LESION OF RIGHT EAR: ICD-10-CM

## 2024-01-04 RX ORDER — TRIAMCINOLONE ACETONIDE 1 MG/G
CREAM TOPICAL
Qty: 30 G | Refills: 1 | Status: SHIPPED | OUTPATIENT
Start: 2024-01-04

## 2024-05-23 DIAGNOSIS — I10 ESSENTIAL HYPERTENSION: ICD-10-CM

## 2024-05-23 DIAGNOSIS — R60.0 LOWER EXTREMITY EDEMA: ICD-10-CM

## 2024-05-24 RX ORDER — TRIAMTERENE AND HYDROCHLOROTHIAZIDE 37.5; 25 MG/1; MG/1
1 TABLET ORAL DAILY
Qty: 30 TABLET | Refills: 5 | Status: SHIPPED | OUTPATIENT
Start: 2024-05-24 | End: 2024-05-28 | Stop reason: SDUPTHER

## 2024-05-28 ENCOUNTER — OFFICE VISIT (OUTPATIENT)
Dept: FAMILY MEDICINE CLINIC | Facility: CLINIC | Age: 64
End: 2024-05-28
Payer: COMMERCIAL

## 2024-05-28 VITALS
BODY MASS INDEX: 21.41 KG/M2 | SYSTOLIC BLOOD PRESSURE: 120 MMHG | WEIGHT: 136.4 LBS | OXYGEN SATURATION: 100 % | RESPIRATION RATE: 18 BRPM | DIASTOLIC BLOOD PRESSURE: 70 MMHG | HEIGHT: 67 IN | HEART RATE: 76 BPM | TEMPERATURE: 100 F

## 2024-05-28 DIAGNOSIS — R60.0 LOWER EXTREMITY EDEMA: ICD-10-CM

## 2024-05-28 DIAGNOSIS — R91.1 LUNG NODULE: Primary | ICD-10-CM

## 2024-05-28 DIAGNOSIS — Z12.11 SCREENING FOR COLON CANCER: ICD-10-CM

## 2024-05-28 DIAGNOSIS — R00.2 PALPITATIONS: ICD-10-CM

## 2024-05-28 DIAGNOSIS — Z78.0 POST-MENOPAUSAL: ICD-10-CM

## 2024-05-28 DIAGNOSIS — Z00.00 WELCOME TO MEDICARE PREVENTIVE VISIT: ICD-10-CM

## 2024-05-28 DIAGNOSIS — R73.01 IMPAIRED FASTING GLUCOSE: ICD-10-CM

## 2024-05-28 DIAGNOSIS — E78.2 MIXED HYPERLIPIDEMIA: ICD-10-CM

## 2024-05-28 DIAGNOSIS — I10 ESSENTIAL HYPERTENSION: ICD-10-CM

## 2024-05-28 PROCEDURE — G0402 INITIAL PREVENTIVE EXAM: HCPCS | Performed by: FAMILY MEDICINE

## 2024-05-28 PROCEDURE — G0403 EKG FOR INITIAL PREVENT EXAM: HCPCS | Performed by: FAMILY MEDICINE

## 2024-05-28 PROCEDURE — 92552 PURE TONE AUDIOMETRY AIR: CPT | Performed by: FAMILY MEDICINE

## 2024-05-28 PROCEDURE — 99214 OFFICE O/P EST MOD 30 MIN: CPT | Performed by: FAMILY MEDICINE

## 2024-05-28 RX ORDER — TRIAMTERENE AND HYDROCHLOROTHIAZIDE 37.5; 25 MG/1; MG/1
1 TABLET ORAL DAILY
Qty: 30 TABLET | Refills: 5 | Status: SHIPPED | OUTPATIENT
Start: 2024-05-28

## 2024-05-28 RX ORDER — ZOSTER VACCINE RECOMBINANT, ADJUVANTED 50 MCG/0.5
0.5 KIT INTRAMUSCULAR ONCE
Qty: 1 EACH | Refills: 1 | Status: CANCELLED | OUTPATIENT
Start: 2024-05-28 | End: 2024-05-28

## 2024-05-28 NOTE — PATIENT INSTRUCTIONS
Medicare Preventive Visit Patient Instructions  Thank you for completing your Welcome to Medicare Visit or Medicare Annual Wellness Visit today. Your next wellness visit will be due in one year (5/29/2025).  The screening/preventive services that you may require over the next 5-10 years are detailed below. Some tests may not apply to you based off risk factors and/or age. Screening tests ordered at today's visit but not completed yet may show as past due. Also, please note that scanned in results may not display below.  Preventive Screenings:  Service Recommendations Previous Testing/Comments   Colorectal Cancer Screening  * Colonoscopy    * Fecal Occult Blood Test (FOBT)/Fecal Immunochemical Test (FIT)  * Fecal DNA/Cologuard Test  * Flexible Sigmoidoscopy Age: 45-75 years old   Colonoscopy: every 10 years (may be performed more frequently if at higher risk)  OR  FOBT/FIT: every 1 year  OR  Cologuard: every 3 years  OR  Sigmoidoscopy: every 5 years  Screening may be recommended earlier than age 45 if at higher risk for colorectal cancer. Also, an individualized decision between you and your healthcare provider will decide whether screening between the ages of 76-85 would be appropriate. Colonoscopy: 11/02/2018  FOBT/FIT: Not on file  Cologuard: Not on file  Sigmoidoscopy: Not on file          Breast Cancer Screening Age: 40+ years old  Frequency: every 1-2 years  Not required if history of left and right mastectomy Mammogram: 11/20/2018        Cervical Cancer Screening Between the ages of 21-29, pap smear recommended once every 3 years.   Between the ages of 30-65, can perform pap smear with HPV co-testing every 5 years.   Recommendations may differ for women with a history of total hysterectomy, cervical cancer, or abnormal pap smears in past. Pap Smear: Not on file        Hepatitis C Screening Once for adults born between 1945 and 1965  More frequently in patients at high risk for Hepatitis C Hep C Antibody: Not  on file        Diabetes Screening 1-2 times per year if you're at risk for diabetes or have pre-diabetes Fasting glucose: No results in last 5 years (No results in last 5 years)  A1C: No results in last 5 years (No results in last 5 years)      Cholesterol Screening Once every 5 years if you don't have a lipid disorder. May order more often based on risk factors. Lipid panel: 11/07/2023          Other Preventive Screenings Covered by Medicare:  Abdominal Aortic Aneurysm (AAA) Screening: covered once if your at risk. You're considered to be at risk if you have a family history of AAA.  Lung Cancer Screening: covers low dose CT scan once per year if you meet all of the following conditions: (1) Age 55-77; (2) No signs or symptoms of lung cancer; (3) Current smoker or have quit smoking within the last 15 years; (4) You have a tobacco smoking history of at least 20 pack years (packs per day multiplied by number of years you smoked); (5) You get a written order from a healthcare provider.  Glaucoma Screening: covered annually if you're considered high risk: (1) You have diabetes OR (2) Family history of glaucoma OR (3)  aged 50 and older OR (4)  American aged 65 and older  Osteoporosis Screening: covered every 2 years if you meet one of the following conditions: (1) You're estrogen deficient and at risk for osteoporosis based off medical history and other findings; (2) Have a vertebral abnormality; (3) On glucocorticoid therapy for more than 3 months; (4) Have primary hyperparathyroidism; (5) On osteoporosis medications and need to assess response to drug therapy.   Last bone density test (DXA Scan): Not on file.  HIV Screening: covered annually if you're between the age of 15-65. Also covered annually if you are younger than 15 and older than 65 with risk factors for HIV infection. For pregnant patients, it is covered up to 3 times per pregnancy.    Immunizations:  Immunization Recommendations    Influenza Vaccine Annual influenza vaccination during flu season is recommended for all persons aged >= 6 months who do not have contraindications   Pneumococcal Vaccine   * Pneumococcal conjugate vaccine = PCV13 (Prevnar 13), PCV15 (Vaxneuvance), PCV20 (Prevnar 20)  * Pneumococcal polysaccharide vaccine = PPSV23 (Pneumovax) Adults 19-65 yo with certain risk factors or if 65+ yo  If never received any pneumonia vaccine: recommend Prevnar 20 (PCV20)  Give PCV20 if previously received 1 dose of PCV13 or PPSV23   Hepatitis B Vaccine 3 dose series if at intermediate or high risk (ex: diabetes, end stage renal disease, liver disease)   Respiratory syncytial virus (RSV) Vaccine - COVERED BY MEDICARE PART D  * RSVPreF3 (Arexvy) CDC recommends that adults 60 years of age and older may receive a single dose of RSV vaccine using shared clinical decision-making (SCDM)   Tetanus (Td) Vaccine - COST NOT COVERED BY MEDICARE PART B Following completion of primary series, a booster dose should be given every 10 years to maintain immunity against tetanus. Td may also be given as tetanus wound prophylaxis.   Tdap Vaccine - COST NOT COVERED BY MEDICARE PART B Recommended at least once for all adults. For pregnant patients, recommended with each pregnancy.   Shingles Vaccine (Shingrix) - COST NOT COVERED BY MEDICARE PART B  2 shot series recommended in those 19 years and older who have or will have weakened immune systems or those 50 years and older     Health Maintenance Due:      Topic Date Due    HIV Screening  Never done    Breast Cancer Screening: Mammogram  11/20/2019    Colorectal Cancer Screening  11/02/2023    Hepatitis C Screening  10/26/2024 (Originally 1960)     Immunizations Due:      Topic Date Due    COVID-19 Vaccine (3 - 2023-24 season) 09/01/2023    Pneumococcal Vaccine: 65+ Years (1 of 1 - PCV) Never done     Advance Directives   What are advance directives?  Advance directives are legal documents that state  your wishes and plans for medical care. These plans are made ahead of time in case you lose your ability to make decisions for yourself. Advance directives can apply to any medical decision, such as the treatments you want, and if you want to donate organs.   What are the types of advance directives?  There are many types of advance directives, and each state has rules about how to use them. You may choose a combination of any of the following:  Living will:  This is a written record of the treatment you want. You can also choose which treatments you do not want, which to limit, and which to stop at a certain time. This includes surgery, medicine, IV fluid, and tube feedings.   Durable power of  for healthcare (DPAHC):  This is a written record that states who you want to make healthcare choices for you when you are unable to make them for yourself. This person, called a proxy, is usually a family member or a friend. You may choose more than 1 proxy.  Do not resuscitate (DNR) order:  A DNR order is used in case your heart stops beating or you stop breathing. It is a request not to have certain forms of treatment, such as CPR. A DNR order may be included in other types of advance directives.  Medical directive:  This covers the care that you want if you are in a coma, near death, or unable to make decisions for yourself. You can list the treatments you want for each condition. Treatment may include pain medicine, surgery, blood transfusions, dialysis, IV or tube feedings, and a ventilator (breathing machine).  Values history:  This document has questions about your views, beliefs, and how you feel and think about life. This information can help others choose the care that you would choose.  Why are advance directives important?  An advance directive helps you control your care. Although spoken wishes may be used, it is better to have your wishes written down. Spoken wishes can be misunderstood, or not  followed. Treatments may be given even if you do not want them. An advance directive may make it easier for your family to make difficult choices about your care.       © Copyright [employer] 2018 Information is for End User's use only and may not be sold, redistributed or otherwise used for commercial purposes. All illustrations and images included in CareNotes® are the copyrighted property of [employer], [employer] or [employer]  Please get labs done fasting, along with mammogram, echocardiogram. CT of the chest and your dexa scan.   I will call with results of all.

## 2024-05-28 NOTE — PROGRESS NOTES
"Ambulatory Visit  Name: Savannah Coe      : 1960      MRN: [id number]  Encounter Provider: Dior Hobbs DO  Encounter Date: 2024   Encounter department: [facility]    Assessment & Plan   1. Welcome to Medicare preventive visit  2. Essential hypertension  -     triamterene-hydrochlorothiazide (MAXZIDE-25) 37.5-25 mg per tablet; Take 1 tablet by mouth daily  Bp at goal on current medications.   Has order for CMP and agrees to have this done. Will reprint order  3. Impaired fasting glucose  Has been watching carbs in diet since her last visit.   Due for labs.   4. Mixed hyperlipidemia  Lab Results   Component Value Date    CHOLESTEROL 271 (H) 2023     Lab Results   Component Value Date    HDL 83 2023     Lab Results   Component Value Date    TRIG 150 (H) 2023     No results found for: \"NONHDLC\"  The 10-year ASCVD risk score (Karin WEST, et al., 2019) is: 6.5%    Values used to calculate the score:      Age: 64 years      Sex: Female      Is Non- : No      Diabetic: No      Tobacco smoker: No      Systolic Blood Pressure: 120 mmHg      Is BP treated: Yes      HDL Cholesterol: 83 mg/dL      Total Cholesterol: 271 mg/dL  She has been watching diet and is due for repeat lipid panel  5. Screening for colon cancer  -     Ambulatory Referral to Gastroenterology; Future  6. Lower extremity edema  -     triamterene-hydrochlorothiazide (MAXZIDE-25) 37.5-25 mg per tablet; Take 1 tablet by mouth daily  7. Post-menopausal  -     DXA bone density spine hip and pelvis; Future; Expected date: 2024  8. Palpitations  -     POCT ECG  EKG in office today. NSR at 62 bpm. Normal axis. LVH.  No acute changes. No prior for comparison  Advised patient that the echo will evaluate the ? LVH better than EKG.   9. Lung nodule  -     CT chest wo contrast; Future; Expected date: 2024  In review of records in media file, see that she had xray done at " urgent care in 2018. Noted incidental finding of 6 mm right lung nodule  Never had CT of chest to look at this nor was she aware of this finding.   CT chest ordered.      Preventive health issues were discussed with patient, and age appropriate screening tests were ordered as noted in patient's After Visit Summary. Personalized health advice and appropriate referrals for health education or preventive services given if needed, as noted in patient's After Visit Summary.    History of Present Illness     HPI     Patient is a 64 year old female with hypertension, hyperlipidemia, impaired fasting glucose being seen today for her welcome to medicare physical.   She was last seen in November of 2023.  Has not gone for labs that were ordered at time of her last office visit. Agrees to have them done.   She is up to date on her pap (11/22/23)  Mammogram ordered in October of last year but has not been completed.   Due for dexa scan. Is agreeable.   She is overdue for colon cancer screening. Last colonoscopy was in 2018 and she had polyp. Sees [facility]  Overdue for shingrix, pneumococcal vaccine. She declines both.     Has some additional concerns today as well.   --States that she was told that she still has a gallbladder but it is not functioning. Testing done at [place]. I presume that this may have been a HIDA scan.   Gets intermittent bilateral upper abdominal spasm  intermittently for the last 8+ years. It only when she bends over to put shoes on or with certain movements. Lasts for seconds with just relaxing and taking some deep breaths. No associated nausea, vomiting or anorexia. Bowels move fine. Weight stable.     --She is having some palpitations when she lies down at night or when she feels herself getting stressed. Started since last visit. Will resolve when she calms herself down. Lasts minutes. No associated chest pain or palpitations.     --At her last visit, complained of some lower extremity edema.  Ordered echo. This was never done. She states that she still gets intermittent leg swelling. Occurred over weekend when weather was hot.       Patient Care Team:  Dior Hobbs DO as PCP - General (Family Medicine)    Review of Systems  Medical History Reviewed by provider this encounter:  Tobacco  Allergies  Meds  Problems  Med Hx  Surg Hx  Fam Hx  Soc   Hx      Annual Wellness Visit Questionnaire   Savannah is here for her Welcome to Medicare visit.     Health Risk Assessment:   Patient rates overall health as very good. Patient feels that their physical health rating is slightly better. Patient is very satisfied with their life. Eyesight was rated as same. Hearing was rated as same. Patient feels that their emotional and mental health rating is much better. Patients states they are never, rarely angry. Patient states they are never, rarely unusually tired/fatigued. Pain experienced in the last 7 days has been some. Patient's pain rating has been 1/10. Patient states that she has experienced no weight loss or gain in last 6 months.     Depression Screening:   PHQ-2 Score: 0      Fall Risk Screening:   In the past year, patient has experienced: no history of falling in past year      Urinary Incontinence Screening:   Patient has leaked urine accidently in the last six months.     Home Safety:  Patient does not have trouble with stairs inside or outside of their home. Patient has working smoke alarms and has working carbon monoxide detector. Home safety hazards include: none.     Nutrition:   Current diet is Low Cholesterol and Low Carb.     Medications:   Patient is currently taking over-the-counter supplements. OTC medications include: see medication list. Patient is able to manage medications.     Activities of Daily Living (ADLs)/Instrumental Activities of Daily Living (IADLs):   Walk and transfer into and out of bed and chair?: Yes  Dress and groom yourself?: Yes    Bathe or shower yourself?: Yes     Feed yourself? Yes  Do your laundry/housekeeping?: Yes  Manage your money, pay your bills and track your expenses?: Yes  Make your own meals?: Yes    Do your own shopping?: Yes    Previous Hospitalizations:   Any hospitalizations or ED visits within the last 12 months?: No      Advance Care Planning:   Living will: No    Durable POA for healthcare: No    Advanced directive counseling given: Yes    ACP document given: Yes      Cognitive Screening:   Provider or family/friend/caregiver concerned regarding cognition?: No    PREVENTIVE SCREENINGS      Cardiovascular Screening:    General: Screening Not Indicated and History Lipid Disorder      Diabetes Screening:     General: Screening Current      Colorectal Cancer Screening:     General: Screening Current    Due for: Colonoscopy - High Risk      Breast Cancer Screening:     General: Risks and Benefits Discussed    Due for: Mammogram        Cervical Cancer Screening:    General: Screening Not Indicated      Osteoporosis Screening:    General: Risks and Benefits Discussed    Due for: DXA Appendicular      Abdominal Aortic Aneurysm (AAA) Screening:        General: Screening Not Indicated      Lung Cancer Screening:     General: Screening Not Indicated      Hepatitis C Screening:    General: Screening Current    Screening, Brief Intervention, and Referral to Treatment (SBIRT)    Screening  Typical number of drinks in a day: 1  Typical number of drinks in a week: 7  Interpretation: Low risk drinking behavior.    Single Item Drug Screening:  How often have you used an illegal drug (including marijuana) or a prescription medication for non-medical reasons in the past year? never    Single Item Drug Screen Score: 0  Interpretation: Negative screen for possible drug use disorder    Brief Intervention  Alcohol & drug use screenings were reviewed. No concerns regarding substance use disorder identified.     Other Counseling Topics:   Car/seat belt/driving safety and calcium  "and vitamin D intake and regular weightbearing exercise.        Hearing Screening    125Hz 250Hz 500Hz 1000Hz 2000Hz 3000Hz 4000Hz 5000Hz 6000Hz   Right ear 15 15 15 15 15 15 15 15 15   Left ear 15 15 15 15 15 15 15 15 15     Vision Screening    Right eye Left eye Both eyes   Without correction      With correction 20/13 20/13 20/10       Objective     /70 (BP Location: Left arm, Patient Position: Sitting, Cuff Size: Standard)   Pulse 76   Temp 100 °F (37.8 °C) (Tympanic)   Resp 18   Ht 5' 7\" (1.702 m)   Wt 61.9 kg (136 lb 6.4 oz)   SpO2 100%   BMI 21.36 kg/m²     Physical Exam  Vitals and nursing note reviewed.   Constitutional:       General: She is not in acute distress.     Appearance: Normal appearance. She is not ill-appearing, toxic-appearing or diaphoretic.   HENT:      Head: Normocephalic and atraumatic.      Right Ear: Tympanic membrane normal.      Left Ear: Tympanic membrane normal.      Nose: Nose normal.      Mouth/Throat:      Mouth: Mucous membranes are moist.   Eyes:      Extraocular Movements: Extraocular movements intact.      Conjunctiva/sclera: Conjunctivae normal.      Pupils: Pupils are equal, round, and reactive to light.   Cardiovascular:      Rate and Rhythm: Normal rate and regular rhythm.      Heart sounds: No murmur heard.  Pulmonary:      Effort: Pulmonary effort is normal.      Breath sounds: Normal breath sounds.   Abdominal:      General: Abdomen is flat. Bowel sounds are normal. There is no distension.      Palpations: Abdomen is soft. There is no mass.      Tenderness: There is no abdominal tenderness.   Musculoskeletal:      Cervical back: Normal range of motion and neck supple.      Right lower leg: No edema.      Left lower leg: No edema.   Lymphadenopathy:      Cervical: No cervical adenopathy.   Skin:     General: Skin is warm and dry.      Findings: No rash.   Neurological:      General: No focal deficit present.      Mental Status: She is alert and oriented to " person, place, and time.   Psychiatric:         Mood and Affect: Mood normal.       Administrative Statements

## 2024-10-16 ENCOUNTER — TELEPHONE (OUTPATIENT)
Age: 64
End: 2024-10-16

## 2024-10-16 NOTE — TELEPHONE ENCOUNTER
Patient called stating she went to schedule her CT scan and was told PCP needs to contact insurance company for authorization for CT scan.  Patient stated she was told she needs authorization code to schedule CT.  Please advise.

## 2024-11-04 DIAGNOSIS — Z12.31 ENCOUNTER FOR SCREENING MAMMOGRAM FOR MALIGNANT NEOPLASM OF BREAST: ICD-10-CM

## 2024-11-05 ENCOUNTER — TELEPHONE (OUTPATIENT)
Dept: FAMILY MEDICINE CLINIC | Facility: CLINIC | Age: 64
End: 2024-11-05

## 2024-11-05 DIAGNOSIS — Z78.0 POST-MENOPAUSAL: ICD-10-CM

## 2024-11-05 PROBLEM — M85.80 OSTEOPENIA: Status: ACTIVE | Noted: 2024-11-05

## 2024-11-05 NOTE — TELEPHONE ENCOUNTER
----- Message from Dior Hobbs DO sent at 11/4/2024 12:59 PM EST -----  Please let patient know that her mammogram was normal. Thanks.

## 2024-11-06 ENCOUNTER — TELEPHONE (OUTPATIENT)
Dept: ADMINISTRATIVE | Facility: OTHER | Age: 64
End: 2024-11-06

## 2024-11-06 ENCOUNTER — TELEPHONE (OUTPATIENT)
Dept: FAMILY MEDICINE CLINIC | Facility: CLINIC | Age: 64
End: 2024-11-06

## 2024-11-06 NOTE — TELEPHONE ENCOUNTER
Upon review of the In Basket request we were able to note that no further action is required. The patient chart is up to date as a result of a previous request.      Any additional questions or concerns should be emailed to the Practice Liaisons via the appropriate education email address, please do not reply via In Basket.    Thank you  Merlene Gonzalez MA   [facility]

## 2024-11-06 NOTE — TELEPHONE ENCOUNTER
Patient aware and verbalized understanding.     ----- Message from Dior Hobbs DO sent at 11/5/2024  1:04 PM EST -----  Let patient know that her bone density test showed osteopenia.   Recommend calcium, vitamin D and weight bearing exercise

## 2024-11-06 NOTE — TELEPHONE ENCOUNTER
----- Message from Kelley TRIVEDI sent at 11/5/2024 12:49 PM EST -----  Regarding: Care Gap Request  08/01/24 8:48 AM    Hello, our patient No patient name on file. has had DEXA Scan completed/performed. Please assist in updating the patient chart by making an External outreach to [facility] facility located in [place]. The date of service is 10/30/2024.    Thank you,  Ashleigh Fothergill [facility]

## 2025-04-28 ENCOUNTER — TELEPHONE (OUTPATIENT)
Age: 65
End: 2025-04-28

## 2025-04-28 DIAGNOSIS — R60.0 LOWER EXTREMITY EDEMA: ICD-10-CM

## 2025-04-28 DIAGNOSIS — I10 ESSENTIAL HYPERTENSION: ICD-10-CM

## 2025-04-28 NOTE — TELEPHONE ENCOUNTER
Medication: triamterene-hydrochlorothiazide (MAXZIDE-25) 37.5-25 mg per tablet     Dose/Frequency: Take 1 tablet by mouth daily     Quantity: 30    Pharmacy: [facility]- MARIB[facility] Fajardo - [place] - [facility] [address] [facility]     Office:   [x] PCP/Provider - Dior Hobbs, DO   [] Speciality/Provider -     Does the patient have enough for 3 days?   [x] Yes   [] No - Send as HP to POD

## 2025-04-28 NOTE — PROGRESS NOTES
Name: Savannah Coe      : 1960      MRN: [id number]  Encounter Provider: Dior Hobbs DO  Encounter Date: 2025   Encounter department: [facility] PRIMARY CARE    Assessment & Plan  Cellulitis of left hand  S/p puncture wound   Reviewed ED note and xray results  She is taking cefadroxil and bactrim and notes significant improvement in the swelling since starting antibiotic.   Received tetanus vaccine in ED as well.   While she states swelling has decreased significantly, I am concerned that there may be localized fluid collection/abscess.   Refer orthopedic hand surgery asap.   Patient to continue antibiotics and call if fever or extension of the redness   Advised to call me if she does not hear from them by days end tomorrow for scheduling.        Impaired fasting glucose         Mixed hyperlipidemia         Essential hypertension  BP is at goal.   She was last seen here for this in may of 2024.   She was given rx to have labs done but never went.   She requested refill on her maxzide today which I did send but advised of need to have these labs done to check electrolytes and kidney function.               History of Present Illness     HPI  Patient is a 65 year old female with hypertension, hyperlipidemia, impaired fasting glucose being seen today for ED f/u visit  She was working on furniture on 25 and the ice pick type tool she was using slipped, stabbing her in dorsum of left hand.   Next AM woke up with pain, swelling and redness  Sought care in ED at [facility] on 25.   Xray negative for fracture and negative for foreign body. There was soft tissue swelling. Had bedside US to evaluate for abscess according to ED note.   Received IV unasyn and her tetanus shot in ED and was discharged on bactrim and cefadroxil for dx of left hand cellulitis    Review of Systems  Past Medical History:   Diagnosis Date    Hypertension      History reviewed. No pertinent surgical  history.  Family History   Problem Relation Age of Onset    Hodgkin's lymphoma Mother     Heart disease Father     Thyroid disease Sister      Social History     Tobacco Use    Smoking status: Former     Current packs/day: 0.00     Average packs/day: 0.5 packs/day for 12.6 years (6.3 ttl pk-yrs)     Types: Cigarettes     Start date: 1977     Quit date: 1989     Years since quittin.8    Smokeless tobacco: Never   Vaping Use    Vaping status: Never Used   Substance and Sexual Activity    Alcohol use: Yes     Alcohol/week: 5.0 standard drinks of alcohol     Types: 5 Glasses of wine per week    Drug use: Never    Sexual activity: Not Currently     Partners: Male     Current Outpatient Medications on File Prior to Visit   Medication Sig    CEFADROXIL PO Take 500 mg by mouth 2 (two) times a day    nicotine polacrilex (NICORETTE) 4 mg gum Chew 2 mg as needed for smoking cessation    sulfamethoxazole-trimethoprim (BACTRIM DS) 800-160 mg per tablet Take 1 tablet by mouth every 12 (twelve) hours    triamcinolone (KENALOG) 0.1 % cream APPLY TO THE AFFECTED AREA(S) TOPICALLY TWICE DAILY    triamterene-hydrochlorothiazide (MAXZIDE-25) 37.5-25 mg per tablet Take 1 tablet by mouth daily     No Known Allergies  Immunization History   Administered Date(s) Administered    COVID-19 MODERNA VACC 0.5 ML IM 2021, 10/21/2021    Tdap 10/26/2023     Objective   /62 (BP Location: Left arm, Patient Position: Sitting, Cuff Size: Standard)   Pulse 82   Temp 97.6 °F (36.4 °C) (Tympanic)   Wt 65.8 kg (145 lb)   SpO2 100%   BMI 22.71 kg/m²     Physical Exam  Vitals and nursing note reviewed.   Eyes:      Conjunctiva/sclera: Conjunctivae normal.   Cardiovascular:      Rate and Rhythm: Normal rate and regular rhythm.      Heart sounds: No murmur heard.  Pulmonary:      Effort: Pulmonary effort is normal.      Breath sounds: Normal breath sounds.   Musculoskeletal:      Cervical back: Normal range of motion and neck  supple.      Comments: Left hand with erythema and swelling dorsally most prominent over 1st and 2nd metacarpals.   There is no drainage.   + tenderness.   Patient with difficulty making a fist due to swelling but otherwise moving all fingers normally   Lymphadenopathy:      Cervical: No cervical adenopathy.

## 2025-04-28 NOTE — TELEPHONE ENCOUNTER
Elidia called in to schedule a ER follow up. She explained that she messed up her hand really bad. The ER said that she needs to schedule an appointment with her PCP for today. I have no options to schedule Berverly in for today or tomorrow. Please call Savannah to help with schedule appointment.

## 2025-04-29 ENCOUNTER — OFFICE VISIT (OUTPATIENT)
Dept: FAMILY MEDICINE CLINIC | Facility: CLINIC | Age: 65
End: 2025-04-29
Payer: COMMERCIAL

## 2025-04-29 ENCOUNTER — TELEPHONE (OUTPATIENT)
Age: 65
End: 2025-04-29

## 2025-04-29 VITALS
DIASTOLIC BLOOD PRESSURE: 62 MMHG | HEART RATE: 82 BPM | SYSTOLIC BLOOD PRESSURE: 134 MMHG | BODY MASS INDEX: 22.71 KG/M2 | WEIGHT: 145 LBS | OXYGEN SATURATION: 100 % | TEMPERATURE: 97.6 F

## 2025-04-29 DIAGNOSIS — L03.114 CELLULITIS OF LEFT HAND: Primary | ICD-10-CM

## 2025-04-29 DIAGNOSIS — R73.01 IMPAIRED FASTING GLUCOSE: ICD-10-CM

## 2025-04-29 DIAGNOSIS — R60.0 LOWER EXTREMITY EDEMA: ICD-10-CM

## 2025-04-29 DIAGNOSIS — E78.2 MIXED HYPERLIPIDEMIA: ICD-10-CM

## 2025-04-29 DIAGNOSIS — I10 ESSENTIAL HYPERTENSION: ICD-10-CM

## 2025-04-29 PROCEDURE — 99213 OFFICE O/P EST LOW 20 MIN: CPT | Performed by: FAMILY MEDICINE

## 2025-04-29 RX ORDER — SULFAMETHOXAZOLE AND TRIMETHOPRIM 800; 160 MG/1; MG/1
1 TABLET ORAL EVERY 12 HOURS SCHEDULED
COMMUNITY

## 2025-04-29 RX ORDER — TRIAMTERENE AND HYDROCHLOROTHIAZIDE 37.5; 25 MG/1; MG/1
1 TABLET ORAL DAILY
Qty: 30 TABLET | Refills: 0 | Status: SHIPPED | OUTPATIENT
Start: 2025-04-29 | End: 2025-06-10 | Stop reason: SDUPTHER

## 2025-04-29 RX ORDER — TRIAMTERENE AND HYDROCHLOROTHIAZIDE 37.5; 25 MG/1; MG/1
1 TABLET ORAL DAILY
Qty: 30 TABLET | Refills: 0 | Status: CANCELLED | OUTPATIENT
Start: 2025-04-29

## 2025-04-29 NOTE — ASSESSMENT & PLAN NOTE
BP is at goal.   She was last seen here for this in may of 2024.   She was given rx to have labs done but never went.   She requested refill on her maxzide today which I did send but advised of need to have these labs done to check electrolytes and kidney function.

## 2025-04-29 NOTE — TELEPHONE ENCOUNTER
Patient seen for appt for other issue today  Rx refilled and she was advised to complete labs as ordered

## 2025-05-01 ENCOUNTER — OFFICE VISIT (OUTPATIENT)
Dept: OBGYN CLINIC | Facility: CLINIC | Age: 65
End: 2025-05-01
Payer: COMMERCIAL

## 2025-05-01 VITALS — HEIGHT: 67 IN | BODY MASS INDEX: 22.71 KG/M2

## 2025-05-01 DIAGNOSIS — S60.222A HEMATOMA OF LEFT HAND: Primary | ICD-10-CM

## 2025-05-01 PROCEDURE — 99203 OFFICE O/P NEW LOW 30 MIN: CPT | Performed by: ORTHOPAEDIC SURGERY

## 2025-05-01 PROCEDURE — 20612 ASPIRATE/INJ GANGLION CYST: CPT | Performed by: ORTHOPAEDIC SURGERY

## 2025-05-01 RX ORDER — CEFADROXIL 500 MG/1
1 CAPSULE ORAL 2 TIMES DAILY
COMMUNITY
Start: 2025-04-26

## 2025-05-01 NOTE — PROGRESS NOTES
ORTHOPAEDIC HAND, WRIST, AND ELBOW OFFICE  VISIT     Name: Savannah Coe      : 1960      MRN: [id number]  Encounter Provider: Kyra Valencia MD  Encounter Date: 2025   Encounter department: [facility] [place]  :  Assessment & Plan  Hematoma of left hand  Subjective history, clinical exam, and diagnostic studies reviewed  Diagnosis discussed - hematoma of left dorsal hand  Treatment options discussed which include nonoperative and operative management.  We discussed use of splinting, therapy, activity modification, use of NSAIDs (oral and topical), and aspiration.  We discussed risks and benefits of each as well as expected reasonable outcomes.   Aspiration attempted but yielded small amount of blood. No pus.  The patient was given the opportunity to ask questions.  Questions were answered to the patient's satisfaction.  The patient decided to move forward with continued observation via shared decision making. Warm soapy soaks were encouraged. Edema glove was encouraged to reduce swelling. Complete antibiotics as directed.  Follow up as needed  Orders:    Ambulatory Referral to Orthopedic Surgery    Hand/upper extremity injection: L wrist      History of Present Illness   HPI  Chief Complaint   Patient presents with    Left Hand - New Patient Visit, Cellulitis       Savannah Coe is a 65 y.o. female who presents to the office for evaluation of left hand swelling. She notes that she was upholstering and stabbed her left hand on 2025.  She has been seen in the ER and by her PCP. She has been placed on antibiotics. She has noted swelling and redness to the area. She has had limited hand motion due to the swelling.     Hand dominance: Right    REVIEW OF SYSTEMS:  General: no fever, no chills  HEENT:  No loss of hearing or eyesight problems  Eyes:  No red eyes  Respiratory:  No coughing, shortness of breath or wheezing  Cardiovascular:  No chest pain, no  "palpitations  GI:  Abdomen soft nontender, denies nausea  Endocrine:  No muscle weakness, no frequent urination, no excessive thirst  Urinary:  No dysuria, no incontinence  Musculoskeletal: see HPI and PE  SKIN:  No skin rash, no dry skin  Neurological:  No headaches, no confusion  Psychiatric:  No suicide thoughts, no anxiety, no depression  Review of all other systems is negative    Objective   Ht 5' 7\" (1.702 m)   BMI 22.71 kg/m²      General: well developed and well nourished, alert, oriented times 3, and appears comfortable  Psychiatric: Normal  HEENT: Trachea Midline, No torticollis  Cardiovascular: No discernable arrhythmia  Pulmonary: No wheezing or stridor  Abdomen: No rebound or guarding  Extremities: No peripheral edema  Skin: No Lacerations, No Fluctuation, No Ulcerations  Neurovascular: Sensation Intact to the Median, Ulnar, Radial Nerve, Motor Intact to the Median, Ulnar, Radial Nerve, and Pulses Intact    Musculoskeletal exam:  Left hand: small puncture wound present over dorsal hand a few cm proximal to the index finger MCP.  No drainage. Mass measures 3 cm in length, 1.5 cm in width and 7 mm in height. Mass is firm and tender to palpation. Erythema present.    FROM fingers.  Good finger extensor strength      STUDIES REVIEWED:  Images were reviewed in PACS and demonstrate: X-rays left hand 04/26/2025: no acute osseous abnormalities noted      PROCEDURES PERFORMED:  Hand/upper extremity injection: L wrist  Universal Protocol:  Consent: Verbal consent obtained.  Risks and benefits: risks, benefits and alternatives were discussed  Consent given by: patient  Time out: Immediately prior to procedure a \"time out\" was called to verify the correct patient, procedure, equipment, support staff and site/side marked as required.  Timeout called at: 5/1/2025 11:33 AM.  Patient understanding: patient states understanding of the procedure being performed  Site marked: the operative site was marked  Required " items: required blood products, implants, devices, and special equipment available  Patient identity confirmed: verbally with patient  Supporting Documentation  Indications: pain and diagnostic   Procedure Details  Condition:dorsal carpal ganglion Site: L wrist (Left dorsal hand)   Preparation: Patient was prepped and draped in the usual sterile fashion  Needle size: 18 G  Ultrasound guidance: no  Approach: dorsal  Aspirate amount: 0 mL  Patient tolerance: patient tolerated the procedure well with no immediate complications  Dressing:  Sterile dressing applied    Blood expressed from aspiration site.          Scribe Attestation      I,:  Suzy Flores am acting as a scribe while in the presence of the attending physician.:       I,:  Kyra Valencia MD personally performed the services described in this documentation    as scribed in my presence.:

## 2025-05-01 NOTE — ASSESSMENT & PLAN NOTE
Subjective history, clinical exam, and diagnostic studies reviewed  Diagnosis discussed - hematoma of left dorsal hand  Treatment options discussed which include nonoperative and operative management.  We discussed use of splinting, therapy, activity modification, use of NSAIDs (oral and topical), and aspiration.  We discussed risks and benefits of each as well as expected reasonable outcomes.   Aspiration attempted but yielded small amount of blood. No pus.  The patient was given the opportunity to ask questions.  Questions were answered to the patient's satisfaction.  The patient decided to move forward with continued observation via shared decision making. Warm soapy soaks were encouraged. Edema glove was encouraged to reduce swelling. Complete antibiotics as directed.  Follow up as needed  Orders:    Ambulatory Referral to Orthopedic Surgery    Hand/upper extremity injection: L wrist

## 2025-05-29 ENCOUNTER — OFFICE VISIT (OUTPATIENT)
Dept: OBGYN CLINIC | Facility: CLINIC | Age: 65
End: 2025-05-29
Payer: COMMERCIAL

## 2025-05-29 VITALS — BODY MASS INDEX: 22.76 KG/M2 | HEIGHT: 67 IN | WEIGHT: 145 LBS

## 2025-05-29 DIAGNOSIS — S60.222A HEMATOMA OF LEFT HAND: Primary | ICD-10-CM

## 2025-05-29 PROCEDURE — 99213 OFFICE O/P EST LOW 20 MIN: CPT | Performed by: ORTHOPAEDIC SURGERY

## 2025-05-29 RX ORDER — CEPHALEXIN 500 MG/1
500 CAPSULE ORAL EVERY 6 HOURS SCHEDULED
Qty: 40 CAPSULE | Refills: 0 | Status: SHIPPED | OUTPATIENT
Start: 2025-05-29 | End: 2025-06-08

## 2025-05-29 NOTE — PROGRESS NOTES
"    ORTHOPAEDIC HAND, WRIST, AND ELBOW OFFICE  VISIT     Name: Savannah Coe      : 1960      MRN: [id number]  Encounter Provider: Kyra Valencia MD  Encounter Date: 2025   Encounter department: [facility] [place]  :  Assessment & Plan  Hematoma of left hand  On exam, the hematoma is improving.  At the original puncture site, aspiration was performed and purulent drainage was expressed.  Discussed with the patient she does appear to have a superficial infection.   She was advised to return to doing the warm soapy soaks.  A 10 day prescription was provided for Keflex.  Discussed if this continues to drain, we may consider surgical intervention.   She will follow up in 1.5 weeks for repeat evaluation.            History of Present Illness   HPI  Chief Complaint   Patient presents with    Left Hand - Follow-up, Swelling       Savannah Coe is a 65 y.o. female who presents for follow up of left hand hematoma, DOI 25 when she was upholstering and stabbed her left hand  This was attempted to be aspirated at her last visit. The patient states clear liquid was expressed since her last visit. She has been using the edema glove. She denies a history of gout.     Hand dominance: right     REVIEW OF SYSTEMS:  General: no fever, no chills  HEENT:  No loss of hearing or eyesight problems  Eyes:  No red eyes  Respiratory:  No coughing, shortness of breath or wheezing  Cardiovascular:  No chest pain, no palpitations  GI:  Abdomen soft nontender, denies nausea  Endocrine:  No muscle weakness, no frequent urination, no excessive thirst  Urinary:  No dysuria, no incontinence  Musculoskeletal: see HPI and PE  SKIN:  No skin rash, no dry skin  Neurological:  No headaches, no confusion  Psychiatric:  No suicide thoughts, no anxiety, no depression  Review of all other systems is negative    Objective   Ht 5' 7\" (1.702 m)   Wt 65.8 kg (145 lb)   BMI 22.71 kg/m²      General: " well developed and well nourished, alert, oriented times 3, and appears comfortable  Psychiatric: Normal  HEENT: Trachea Midline, No torticollis  Cardiovascular: No discernable arrhythmia  Pulmonary: No wheezing or stridor  Abdomen: No rebound or guarding  Extremities: No peripheral edema  Skin: No masses, erythema, lacerations, fluctation, ulcerations  Neurovascular: Sensation Intact to the Median, Ulnar, Radial Nerve, Motor Intact to the Median, Ulnar, Radial Nerve, and Pulses Intact    Musculoskeletal exam:  Left hand  NTTP  Improving hematoma (decreased size)  Aspiration performed at the puncture site and purulent drainage was expressed   FROM wrist and fingers    STUDIES REVIEWED:  No Studies to review      PROCEDURES PERFORMED:  None performed   -     Scribe Attestation      I,:  Samanta Aranda MA am acting as a scribe while in the presence of the attending physician.:       I,:  Kyra Valencia MD personally performed the services described in this documentation    as scribed in my presence.:

## 2025-05-29 NOTE — ASSESSMENT & PLAN NOTE
On exam, the hematoma is improving.  At the original puncture site, aspiration was performed and purulent drainage was expressed.  Discussed with the patient she does appear to have a superficial infection.   She was advised to return to doing the warm soapy soaks.  A 10 day prescription was provided for Keflex.  Discussed if this continues to drain, we may consider surgical intervention.   She will follow up in 1.5 weeks for repeat evaluation.

## 2025-06-10 DIAGNOSIS — I10 ESSENTIAL HYPERTENSION: ICD-10-CM

## 2025-06-10 DIAGNOSIS — R60.0 LOWER EXTREMITY EDEMA: ICD-10-CM

## 2025-06-10 NOTE — TELEPHONE ENCOUNTER
Medication: triamterene-hydrochlorothiazide (MAXZIDE-25) 37.5-25 mg per tablet     Dose/Frequency: Take 1 tablet by mouth daily    Quantity:  30 tabs    [place] [facility] [address] [phone]     Office:   [x] PCP/Provider - Dior Hobbs, DO   [] Speciality/Provider -     Does the patient have enough for 3 days?   [x] Yes   [] No - Send as HP to POD

## 2025-06-11 ENCOUNTER — OFFICE VISIT (OUTPATIENT)
Dept: OBGYN CLINIC | Facility: CLINIC | Age: 65
End: 2025-06-11
Payer: COMMERCIAL

## 2025-06-11 VITALS — HEIGHT: 67 IN | BODY MASS INDEX: 22.6 KG/M2 | WEIGHT: 144 LBS

## 2025-06-11 DIAGNOSIS — S60.222A HEMATOMA OF LEFT HAND: Primary | ICD-10-CM

## 2025-06-11 PROCEDURE — 99213 OFFICE O/P EST LOW 20 MIN: CPT | Performed by: ORTHOPAEDIC SURGERY

## 2025-06-11 RX ORDER — TRIAMTERENE AND HYDROCHLOROTHIAZIDE 37.5; 25 MG/1; MG/1
1 TABLET ORAL DAILY
Qty: 30 TABLET | Refills: 0 | Status: SHIPPED | OUTPATIENT
Start: 2025-06-11

## 2025-06-11 NOTE — TELEPHONE ENCOUNTER
Refilled for #30 only Needs appt and labs.   She canceled her AMWV on 6/3 and has not gone for labs.

## 2025-06-11 NOTE — ASSESSMENT & PLAN NOTE
Hematoma looks improved upon exam   At the original puncture site, aspiration was performed and purulent drainage was expressed at the last office visit. No purulent drainage today   Wear edema glove at night time only   Continue with soaks    Orthopedic Hand Surgery Sterile Saline Soak Directions  Perform hand soak three times daily for 10-15 minutes    Fill a container with enough warm water to cover your wound  Add one pump of antibacterial soap that you use to wash your hands  While soaking, you should exercise and move your fingers if your therapist has instructed you to do so  Throw away solution after soaking your hand, and make up fresh solution for each new soak  Pat wound/incision dry and re-dress if instructed by physician to keep the area covered  Follow up in 4 weeks if needed

## 2025-06-11 NOTE — PROGRESS NOTES
ORTHOPAEDIC HAND, WRIST, AND ELBOW OFFICE  VISIT     Name: Savannah Coe      : 1960      MRN: [id number]  Encounter Provider: Kyra Valencia MD  Encounter Date: 2025   Encounter department: [facility] [place]  :  Assessment & Plan  Hematoma of left hand       Hematoma looks improved upon exam   At the original puncture site, aspiration was performed and purulent drainage was expressed at the last office visit. No purulent drainage today   Wear edema glove at night time only   Continue with soaks    Orthopedic Hand Surgery Sterile Saline Soak Directions  Perform hand soak three times daily for 10-15 minutes    Fill a container with enough warm water to cover your wound  Add one pump of antibacterial soap that you use to wash your hands  While soaking, you should exercise and move your fingers if your therapist has instructed you to do so  Throw away solution after soaking your hand, and make up fresh solution for each new soak  Pat wound/incision dry and re-dress if instructed by physician to keep the area covered  Follow up in 4 weeks if needed       History of Present Illness   HPI  Chief Complaint   Patient presents with    Left Wrist - Follow-up       Savannah Coe is a 65 y.o. female who presents for follow up of left hand hematoma, DOI 25.  Previously seen on 25 where we aspirated. States her hand looks better compared to previous visit. Feels pain on side medial side of index finger, thinks its caused from glove.         REVIEW OF SYSTEMS:  General: no fever, no chills  HEENT:  No loss of hearing or eyesight problems  Eyes:  No red eyes  Respiratory:  No coughing, shortness of breath or wheezing  Cardiovascular:  No chest pain, no palpitations  GI:  Abdomen soft nontender, denies nausea  Endocrine:  No muscle weakness, no frequent urination, no excessive thirst  Urinary:  No dysuria, no incontinence  Musculoskeletal: see HPI and PE  SKIN:   "No skin rash, no dry skin  Neurological:  No headaches, no confusion  Psychiatric:  No suicide thoughts, no anxiety, no depression  Review of all other systems is negative    Objective   Ht 5' 7\" (1.702 m)   Wt 65.3 kg (144 lb)   BMI 22.55 kg/m²      General: well developed and well nourished, alert, oriented times 3, and appears comfortable  Psychiatric: Normal  HEENT: Trachea Midline, No torticollis  Cardiovascular: No discernable arrhythmia  Pulmonary: No wheezing or stridor  Abdomen: No rebound or guarding  Extremities: No peripheral edema  Skin: No masses, erythema, lacerations, fluctation, ulcerations  Neurovascular: Sensation Intact to the Median, Ulnar, Radial Nerve, Motor Intact to the Median, Ulnar, Radial Nerve, and Pulses Intact    Musculoskeletal exam:  Left hand  NTTP  Improving hematoma (decreased size)  Aspiration performed at the puncture site and bloody drainage was expressed   FROM wrist and fingers      STUDIES REVIEWED:  No Studies to review      PROCEDURES PERFORMED:  Hand/upper extremity injection    Universal Protocol:  Consent: Verbal consent obtained  Risks and benefits: risks, benefits and alternatives were discussed  Consent given by: patient  Timeout called at: 6/11/2025 2:43 PM.  Patient understanding: patient states understanding of the procedure being performed  Patient consent: the patient's understanding of the procedure matches consent given  Patient identity confirmed: verbally with patient  Supporting Documentation  Indications: pain   Procedure Details  Condition comment: hematoma   Needle size: 25 G  Approach: dorsal  Aspirate: bloody  Patient tolerance: patient tolerated the procedure well with no immediate complications  Dressing:  Sterile dressing applied       -     Scribe Attestation      I,:  Jennifer Murray am acting as a scribe while in the presence of the attending physician.:       I,:  Kyra Valencia MD personally performed the services described in this " documentation    as scribed in my presence.:

## 2025-06-14 LAB
ALBUMIN SERPL-MCNC: 4.6 G/DL (ref 3.9–4.9)
ALP SERPL-CCNC: 49 IU/L (ref 44–121)
ALT SERPL-CCNC: 18 IU/L (ref 0–32)
AST SERPL-CCNC: 19 IU/L (ref 0–40)
BILIRUB SERPL-MCNC: 0.5 MG/DL (ref 0–1.2)
BUN SERPL-MCNC: 22 MG/DL (ref 8–27)
BUN/CREAT SERPL: 19 (ref 12–28)
CALCIUM SERPL-MCNC: 9.8 MG/DL (ref 8.7–10.3)
CHLORIDE SERPL-SCNC: 101 MMOL/L (ref 96–106)
CHOLEST SERPL-MCNC: 256 MG/DL (ref 100–199)
CHOLEST/HDLC SERPL: 3 RATIO (ref 0–4.4)
CO2 SERPL-SCNC: 25 MMOL/L (ref 20–29)
CREAT SERPL-MCNC: 1.15 MG/DL (ref 0.57–1)
EGFR: 53 ML/MIN/1.73
EST. AVERAGE GLUCOSE BLD GHB EST-MCNC: 114 MG/DL
GLOBULIN SER-MCNC: 2.5 G/DL (ref 1.5–4.5)
GLUCOSE SERPL-MCNC: 91 MG/DL (ref 70–99)
HBA1C MFR BLD: 5.6 % (ref 4.8–5.6)
HDLC SERPL-MCNC: 86 MG/DL
LDLC SERPL CALC-MCNC: 158 MG/DL (ref 0–99)
POTASSIUM SERPL-SCNC: 3.8 MMOL/L (ref 3.5–5.2)
PROT SERPL-MCNC: 7.1 G/DL (ref 6–8.5)
SL AMB VLDL CHOLESTEROL CALC: 12 MG/DL (ref 5–40)
SODIUM SERPL-SCNC: 140 MMOL/L (ref 134–144)
TRIGL SERPL-MCNC: 73 MG/DL (ref 0–149)

## 2025-06-16 ENCOUNTER — RESULTS FOLLOW-UP (OUTPATIENT)
Dept: FAMILY MEDICINE CLINIC | Facility: CLINIC | Age: 65
End: 2025-06-16

## 2025-07-09 ENCOUNTER — OFFICE VISIT (OUTPATIENT)
Dept: FAMILY MEDICINE CLINIC | Facility: CLINIC | Age: 65
End: 2025-07-09
Payer: COMMERCIAL

## 2025-07-09 VITALS
BODY MASS INDEX: 22.66 KG/M2 | SYSTOLIC BLOOD PRESSURE: 139 MMHG | OXYGEN SATURATION: 98 % | HEART RATE: 77 BPM | WEIGHT: 144.4 LBS | DIASTOLIC BLOOD PRESSURE: 70 MMHG | HEIGHT: 67 IN

## 2025-07-09 DIAGNOSIS — I10 ESSENTIAL HYPERTENSION: ICD-10-CM

## 2025-07-09 DIAGNOSIS — R73.01 IMPAIRED FASTING GLUCOSE: ICD-10-CM

## 2025-07-09 DIAGNOSIS — Z12.11 COLON CANCER SCREENING: ICD-10-CM

## 2025-07-09 DIAGNOSIS — N28.9 RENAL INSUFFICIENCY: ICD-10-CM

## 2025-07-09 DIAGNOSIS — Z00.00 MEDICARE ANNUAL WELLNESS VISIT, SUBSEQUENT: Primary | ICD-10-CM

## 2025-07-09 DIAGNOSIS — E78.2 MIXED HYPERLIPIDEMIA: ICD-10-CM

## 2025-07-09 PROBLEM — S60.222A: Status: RESOLVED | Noted: 2025-05-01 | Resolved: 2025-07-09

## 2025-07-09 PROCEDURE — 99214 OFFICE O/P EST MOD 30 MIN: CPT | Performed by: FAMILY MEDICINE

## 2025-07-09 PROCEDURE — G0439 PPPS, SUBSEQ VISIT: HCPCS | Performed by: FAMILY MEDICINE

## 2025-07-09 RX ORDER — ZOSTER VACCINE RECOMBINANT, ADJUVANTED 50 MCG/0.5
0.5 KIT INTRAMUSCULAR ONCE
Qty: 1 EACH | Refills: 1 | Status: CANCELLED | OUTPATIENT
Start: 2025-07-09 | End: 2025-07-09

## 2025-07-09 RX ORDER — AMLODIPINE BESYLATE 5 MG/1
5 TABLET ORAL DAILY
Qty: 100 TABLET | Refills: 3 | Status: SHIPPED | OUTPATIENT
Start: 2025-07-09

## 2025-07-09 NOTE — ASSESSMENT & PLAN NOTE
Bp is elevated in office today and have concerns that her maxzide is negatively affecting kidney function  Lab Results   Component Value Date    SODIUM 140 06/13/2025    K 3.8 06/13/2025     06/13/2025    CO2 25 06/13/2025    BUN 22 06/13/2025    CREATININE 1.15 (H) 06/13/2025    GLUC 91 06/13/2025    AST 19 06/13/2025    ALT 18 06/13/2025    TP 7.1 06/13/2025    TBILI 0.5 06/13/2025    EGFR 53 (L) 06/13/2025     D/c her maxzide and start amlodipine 5 mg  Recheck bp in 4 weeks  Repeat bmp prior to that visit  Orders:    amLODIPine (NORVASC) 5 mg tablet; Take 1 tablet (5 mg total) by mouth daily

## 2025-07-09 NOTE — ASSESSMENT & PLAN NOTE
"Lab Results   Component Value Date    CHOLESTEROL 256 (H) 06/13/2025    CHOLESTEROL 271 (H) 11/07/2023     Lab Results   Component Value Date    HDL 86 06/13/2025    HDL 83 11/07/2023     Lab Results   Component Value Date    TRIG 73 06/13/2025    TRIG 150 (H) 11/07/2023     No results found for: \"NONHDLC\"  The 10-year ASCVD risk score (Karin WEST, et al., 2019) is: 9.3%    Values used to calculate the score:      Age: 65 years      Clincally relevant sex: Female      Is Non- : No      Diabetic: No      Tobacco smoker: No      Systolic Blood Pressure: 139 mmHg      Is BP treated: Yes      HDL Cholesterol: 86 mg/dL      Total Cholesterol: 256 mg/dL    I recommend she start statin. She declines. Wants to try with diet alone.        "

## 2025-07-09 NOTE — ASSESSMENT & PLAN NOTE
Bp not at goal on maxzide and wondering if this med is negatively impacting her kidney function  Lab Results   Component Value Date    SODIUM 140 06/13/2025    K 3.8 06/13/2025     06/13/2025    CO2 25 06/13/2025    BUN 22 06/13/2025    CREATININE 1.15 (H) 06/13/2025    GLUC 91 06/13/2025    AST 19 06/13/2025    ALT 18 06/13/2025    TP 7.1 06/13/2025    TBILI 0.5 06/13/2025    EGFR 53 (L) 06/13/2025     Will have her d/c the maxzide and start amlodipine 5 mg daily

## 2025-07-09 NOTE — PROGRESS NOTES
"Name: Savannah Coe      : 1960      MRN: [id number]  Encounter Provider: Dior Hobbs DO  Encounter Date: 2025   Encounter department: [facility] PRIMARY [facility]ARE  :  Assessment & Plan  Medicare annual wellness visit, subsequent         Impaired fasting glucose  Reviewed labs that were completed on 25.   Sugar has improved with dietary changes  Lab Results   Component Value Date    HGBA1C 5.6 2025              Mixed hyperlipidemia  Lab Results   Component Value Date    CHOLESTEROL 256 (H) 2025    CHOLESTEROL 271 (H) 2023     Lab Results   Component Value Date    HDL 86 2025    HDL 83 2023     Lab Results   Component Value Date    TRIG 73 2025    TRIG 150 (H) 2023     No results found for: \"NONHDLC\"  The 10-year ASCVD risk score (Karin WEST, et al., 2019) is: 9.3%    Values used to calculate the score:      Age: 65 years      Clincally relevant sex: Female      Is Non- : No      Diabetic: No      Tobacco smoker: No      Systolic Blood Pressure: 139 mmHg      Is BP treated: Yes      HDL Cholesterol: 86 mg/dL      Total Cholesterol: 256 mg/dL    I recommend she start statin. She declines. Wants to try with diet alone.        Renal insufficiency  Her GFR decreased from 71 to 56 on recent labs.   Will have her d/c the maxzide and start different BP med.   Avoid any NSAIDs and stay hydrated  Recheck labs in about a month.   Orders:  •  Basic metabolic panel; Future    Essential hypertension  Bp is elevated in office today and have concerns that her maxzide is negatively affecting kidney function  Lab Results   Component Value Date    SODIUM 140 2025    K 3.8 2025     2025    CO2 25 2025    BUN 22 2025    CREATININE 1.15 (H) 2025    GLUC 91 2025    AST 19 2025    ALT 18 2025    TP 7.1 2025    TBILI 0.5 2025    EGFR 53 (L) 2025     D/c her maxzide " and start amlodipine 5 mg  Recheck bp in 4 weeks  Repeat bmp prior to that visit  Orders:  •  amLODIPine (NORVASC) 5 mg tablet; Take 1 tablet (5 mg total) by mouth daily    Colon cancer screening    Orders:  •  Ambulatory Referral to Gastroenterology; Future       Preventive health issues were discussed with patient, and age appropriate screening tests were ordered as noted in patient's After Visit Summary. Personalized health advice and appropriate referrals for health education or preventive services given if needed, as noted in patient's After Visit Summary.    History of Present Illness     HPI   Patient is a 65 year old female with hypertension, hyperlipidemia, impaired fasting glucose, osteopenia who is being seen today for annual medicare wellness exam, f/u and review of lab that were completed on 6/13/25.     Colon cancer screen is overdue. Was referred to GI in may of 2024 but never scheduled. She did have colon polyps on last colonoscopy so not eligible for cologuard.   She is up to date on mammogram (10/30/24)  She is up to date on DEXA (10/30/24)  Due for shingrix and pneumococcal vaccines. Declines both  Patient Care Team:  Dior Hobbs DO as PCP - General (Family Medicine)    Review of Systems  Medical History Reviewed by provider this encounter:  Tobacco  Allergies  Meds  Problems  Med Hx  Surg Hx  Fam Hx  Soc   Hx      Annual Wellness Visit Questionnaire   Savannah is here for her Subsequent Wellness visit.     Health Risk Assessment:   Patient rates overall health as very good. Patient feels that their physical health rating is same. Patient is satisfied with their life. Eyesight was rated as same. Hearing was rated as same. Patient feels that their emotional and mental health rating is same. Patients states they are never, rarely angry. Patient states they are never, rarely unusually tired/fatigued. Pain experienced in the last 7 days has been none. Patient states that she has  experienced no weight loss or gain in last 6 months.     Fall Risk Screening:   In the past year, patient has experienced: no history of falling in past year      Urinary Incontinence Screening:   Patient has not leaked urine accidently in the last six months.     Home Safety:  Patient does not have trouble with stairs inside or outside of their home. Patient has working smoke alarms and has working carbon monoxide detector. Home safety hazards include: none.     Nutrition:   Current diet is Low Carb and Regular.     Medications:   Patient is currently taking over-the-counter supplements. OTC medications include: see medication list. Patient is able to manage medications.     Activities of Daily Living (ADLs)/Instrumental Activities of Daily Living (IADLs):   Walk and transfer into and out of bed and chair?: Yes  Dress and groom yourself?: Yes    Bathe or shower yourself?: Yes    Feed yourself? Yes  Do your laundry/housekeeping?: Yes  Manage your money, pay your bills and track your expenses?: Yes  Make your own meals?: Yes    Do your own shopping?: Yes    Previous Hospitalizations:   Any hospitalizations or ED visits within the last 12 months?: Yes    How many hospitalizations have you had in the last year?: 1-2    Hospitalization Comments: Seen in office for follow up- Hand injury    Advance Care Planning:   Living will: No    Durable POA for healthcare: No    Advanced directive: No    Advanced directive counseling given: Yes      Comments: In process of doing her living will    Cognitive Screening:   Provider or family/friend/caregiver concerned regarding cognition?: No    Preventive Screenings      Cardiovascular Screening:    General: Screening Not Indicated and History Lipid Disorder      Diabetes Screening:     General: Screening Current      Colorectal Cancer Screening:     General: Screening Current      Breast Cancer Screening:     General: Screening Current      Cervical Cancer Screening:    General:  Screening Not Indicated      Osteoporosis Screening:    General: Screening Current      Abdominal Aortic Aneurysm (AAA) Screening:        General: Screening Not Indicated      Lung Cancer Screening:     General: Screening Not Indicated      Hepatitis C Screening:    General: Patient Declines    Immunizations:  - Immunizations due: Prevnar 20 and Zoster (Shingrix)  - Risks/benefits immunizations discussed    - The patient declines recommended vaccines currently despite my recommendations      Screening, Brief Intervention, and Referral to Treatment (SBIRT)     Screening  Typical number of drinks in a day: 1  Typical number of drinks in a week: 5  Interpretation: Low risk drinking behavior.    AUDIT-C Screenin) How often did you have a drink containing alcohol in the past year? 4 or more times a week  2) How many drinks did you have on a typical day when you were drinking in the past year? 1 to 2  3) How often did you have 6 or more drinks on one occasion in the past year? never    AUDIT-C Score: 4  Interpretation: Score 3-12 (female): POSITIVE screen for alcohol misuse    AUDIT Screenin) How often during the last year have you found that you were not able to stop drinking once you had started? 0 - never  5) How often during the last year have you failed to do what was normally expected from you because of drinking? 0 - never  6) How often during the last year have you needed a first drink in the morning to get yourself going after a heavy drinking session? 0 - never  7) How often during the last year have you had a feeling of guilt or remorse after drinking? 0 - never  8) How often during the last year have you been unable to remember what happened the night before because you had been drinking? 0 - never  9) Have you or someone else been injured as a result of your drinking? 0 - no  10) Has a relative or friend or a doctor or another health worker been concerned about your drinking or suggested you cut  "down? 0 - no    AUDIT Score: 4  Interpretation: Low risk alcohol consumption    Single Item Drug Screening:  How often have you used an illegal drug (including marijuana) or a prescription medication for non-medical reasons in the past year? never    Single Item Drug Screen Score: 0  Interpretation: Negative screen for possible drug use disorder    Brief Intervention  Alcohol & drug use screenings were reviewed. No concerns regarding substance use disorder identified.     Social Drivers of Health     Food Insecurity: No Food Insecurity (7/9/2025)    Nursing - Inadequate Food Risk Classification    • Worried About Running Out of Food in the Last Year: Never true    • Ran Out of Food in the Last Year: Never true   Transportation Needs: No Transportation Needs (7/9/2025)    PRAPARE - Transportation    • Lack of Transportation (Medical): No    • Lack of Transportation (Non-Medical): No   Housing Stability: Low Risk  (7/9/2025)    Housing Stability Vital Sign    • Unable to Pay for Housing in the Last Year: No    • Number of Times Moved in the Last Year: 0    • Homeless in the Last Year: No   Utilities: Not At Risk (7/9/2025)    [facility] Utilities    • Threatened with loss of utilities: No     No results found.    Objective   /70   Pulse 77   Ht 5' 7\" (1.702 m)   Wt 65.5 kg (144 lb 6.4 oz)   SpO2 98%   BMI 22.62 kg/m²     Physical Exam  Vitals and nursing note reviewed.   Constitutional:       General: She is not in acute distress.     Appearance: Normal appearance. She is not ill-appearing, toxic-appearing or diaphoretic.   HENT:      Head: Normocephalic and atraumatic.      Right Ear: Tympanic membrane normal.      Left Ear: Tympanic membrane normal.      Nose: Nose normal.      Mouth/Throat:      Mouth: Mucous membranes are moist.     Eyes:      Extraocular Movements: Extraocular movements intact.      Conjunctiva/sclera: Conjunctivae normal.      Pupils: Pupils are equal, round, and reactive to light. "       Cardiovascular:      Rate and Rhythm: Normal rate and regular rhythm.      Heart sounds: No murmur heard.  Pulmonary:      Effort: Pulmonary effort is normal.      Breath sounds: Normal breath sounds.   Abdominal:      General: There is no distension.      Palpations: Abdomen is soft. There is no mass.      Tenderness: There is no abdominal tenderness.     Musculoskeletal:      Cervical back: Normal range of motion and neck supple.      Right lower leg: No edema.      Left lower leg: No edema.   Lymphadenopathy:      Cervical: No cervical adenopathy.     Skin:     Findings: No rash.      Comments: Small erythematous papule on base of neck posteriorly.      Neurological:      General: No focal deficit present.      Mental Status: She is alert and oriented to person, place, and time.     Psychiatric:         Mood and Affect: Mood normal.

## 2025-07-09 NOTE — ASSESSMENT & PLAN NOTE
Reviewed labs that were completed on 6/13/25.   Sugar has improved with dietary changes  Lab Results   Component Value Date    HGBA1C 5.6 06/13/2025

## 2025-07-09 NOTE — PATIENT INSTRUCTIONS
Medicare Preventive Visit Patient Instructions  Thank you for completing your Welcome to Medicare Visit or Medicare Annual Wellness Visit today. Your next wellness visit will be due in one year (7/10/2026).  The screening/preventive services that you may require over the next 5-10 years are detailed below. Some tests may not apply to you based off risk factors and/or age. Screening tests ordered at today's visit but not completed yet may show as past due. Also, please note that scanned in results may not display below.  Preventive Screenings:  Service Recommendations Previous Testing/Comments   Colorectal Cancer Screening  * Colonoscopy    * Fecal Occult Blood Test (FOBT)/Fecal Immunochemical Test (FIT)  * Fecal DNA/Cologuard Test  * Flexible Sigmoidoscopy Age: 45-75 years old   Colonoscopy: every 10 years (may be performed more frequently if at higher risk)  OR  FOBT/FIT: every 1 year  OR  Cologuard: every 3 years  OR  Sigmoidoscopy: every 5 years  Screening may be recommended earlier than age 45 if at higher risk for colorectal cancer. Also, an individualized decision between you and your healthcare provider will decide whether screening between the ages of 76-85 would be appropriate. Colonoscopy: 11/02/2018  FOBT/FIT: Not on file  Cologuard: Not on file  Sigmoidoscopy: Not on file          Breast Cancer Screening Age: 40+ years old  Frequency: every 1-2 years  Not required if history of left and right mastectomy Mammogram: 10/30/2024        Cervical Cancer Screening Between the ages of 21-29, pap smear recommended once every 3 years.   Between the ages of 30-65, can perform pap smear with HPV co-testing every 5 years.   Recommendations may differ for women with a history of total hysterectomy, cervical cancer, or abnormal pap smears in past. Pap Smear: Not on file        Hepatitis C Screening Once for adults born between 1945 and 1965  More frequently in patients at high risk for Hepatitis C Hep C Antibody: Not  on file        Diabetes Screening 1-2 times per year if you're at risk for diabetes or have pre-diabetes Fasting glucose: No results in last 5 years (No results in last 5 years)  A1C: 5.6 % (6/13/2025)      Cholesterol Screening Once every 5 years if you don't have a lipid disorder. May order more often based on risk factors. Lipid panel: 06/13/2025          Other Preventive Screenings Covered by Medicare:  Abdominal Aortic Aneurysm (AAA) Screening: covered once if your at risk. You're considered to be at risk if you have a family history of AAA.  Lung Cancer Screening: covers low dose CT scan once per year if you meet all of the following conditions: (1) Age 55-77; (2) No signs or symptoms of lung cancer; (3) Current smoker or have quit smoking within the last 15 years; (4) You have a tobacco smoking history of at least 20 pack years (packs per day multiplied by number of years you smoked); (5) You get a written order from a healthcare provider.  Glaucoma Screening: covered annually if you're considered high risk: (1) You have diabetes OR (2) Family history of glaucoma OR (3)  aged 50 and older OR (4)  American aged 65 and older  Osteoporosis Screening: covered every 2 years if you meet one of the following conditions: (1) You're estrogen deficient and at risk for osteoporosis based off medical history and other findings; (2) Have a vertebral abnormality; (3) On glucocorticoid therapy for more than 3 months; (4) Have primary hyperparathyroidism; (5) On osteoporosis medications and need to assess response to drug therapy.   Last bone density test (DXA Scan): 10/30/2024.  HIV Screening: covered annually if you're between the age of 15-65. Also covered annually if you are younger than 15 and older than 65 with risk factors for HIV infection. For pregnant patients, it is covered up to 3 times per pregnancy.    Immunizations:  Immunization Recommendations   Influenza Vaccine Annual influenza  vaccination during flu season is recommended for all persons aged >= 6 months who do not have contraindications   Pneumococcal Vaccine   * Pneumococcal conjugate vaccine = PCV13 (Prevnar 13), PCV15 (Vaxneuvance), PCV20 (Prevnar 20)  * Pneumococcal polysaccharide vaccine = PPSV23 (Pneumovax) Adults 19-65 yo with certain risk factors or if 65+ yo  If never received any pneumonia vaccine: recommend Prevnar 20 (PCV20)  Give PCV20 if previously received 1 dose of PCV13 or PPSV23   Hepatitis B Vaccine 3 dose series if at intermediate or high risk (ex: diabetes, end stage renal disease, liver disease)   Respiratory syncytial virus (RSV) Vaccine - COVERED BY MEDICARE PART D  * RSVPreF3 (Arexvy) CDC recommends that adults 60 years of age and older may receive a single dose of RSV vaccine using shared clinical decision-making (SCDM)   Tetanus (Td) Vaccine - COST NOT COVERED BY MEDICARE PART B Following completion of primary series, a booster dose should be given every 10 years to maintain immunity against tetanus. Td may also be given as tetanus wound prophylaxis.   Tdap Vaccine - COST NOT COVERED BY MEDICARE PART B Recommended at least once for all adults. For pregnant patients, recommended with each pregnancy.   Shingles Vaccine (Shingrix) - COST NOT COVERED BY MEDICARE PART B  2 shot series recommended in those 19 years and older who have or will have weakened immune systems or those 50 years and older     Health Maintenance Due:      Topic Date Due   • Hepatitis C Screening  Never done   • Colorectal Cancer Screening  11/02/2023   • Breast Cancer Screening: Mammogram  10/30/2025   • DXA SCAN  10/30/2026     Immunizations Due:      Topic Date Due   • Pneumococcal Vaccine: 50+ Years (1 of 1 - PCV) Never done   • COVID-19 Vaccine (3 - 2024-25 season) 09/01/2024   • Influenza Vaccine (1) 09/01/2025     Advance Directives   What are advance directives?  Advance directives are legal documents that state your wishes and plans  for medical care. These plans are made ahead of time in case you lose your ability to make decisions for yourself. Advance directives can apply to any medical decision, such as the treatments you want, and if you want to donate organs.   What are the types of advance directives?  There are many types of advance directives, and each state has rules about how to use them. You may choose a combination of any of the following:  Living will:  This is a written record of the treatment you want. You can also choose which treatments you do not want, which to limit, and which to stop at a certain time. This includes surgery, medicine, IV fluid, and tube feedings.   Durable power of  for healthcare (DPAHC):  This is a written record that states who you want to make healthcare choices for you when you are unable to make them for yourself. This person, called a proxy, is usually a family member or a friend. You may choose more than 1 proxy.  Do not resuscitate (DNR) order:  A DNR order is used in case your heart stops beating or you stop breathing. It is a request not to have certain forms of treatment, such as CPR. A DNR order may be included in other types of advance directives.  Medical directive:  This covers the care that you want if you are in a coma, near death, or unable to make decisions for yourself. You can list the treatments you want for each condition. Treatment may include pain medicine, surgery, blood transfusions, dialysis, IV or tube feedings, and a ventilator (breathing machine).  Values history:  This document has questions about your views, beliefs, and how you feel and think about life. This information can help others choose the care that you would choose.  Why are advance directives important?  An advance directive helps you control your care. Although spoken wishes may be used, it is better to have your wishes written down. Spoken wishes can be misunderstood, or not followed. Treatments may be  given even if you do not want them. An advance directive may make it easier for your family to make difficult choices about your care.       © Copyright [employer] 2018 Information is for End User's use only and may not be sold, redistributed or otherwise used for commercial purposes. All illustrations and images included in CareNotes® are the copyrighted property of [employer] or [employer]

## 2025-07-10 ENCOUNTER — OFFICE VISIT (OUTPATIENT)
Dept: OBGYN CLINIC | Facility: CLINIC | Age: 65
End: 2025-07-10
Payer: COMMERCIAL

## 2025-07-10 VITALS — BODY MASS INDEX: 22.6 KG/M2 | WEIGHT: 144 LBS | HEIGHT: 67 IN

## 2025-07-10 DIAGNOSIS — S60.222A HEMATOMA OF LEFT HAND: Primary | ICD-10-CM

## 2025-07-10 PROCEDURE — 99213 OFFICE O/P EST LOW 20 MIN: CPT | Performed by: ORTHOPAEDIC SURGERY

## 2025-07-10 NOTE — PROGRESS NOTES
"    ORTHOPAEDIC HAND, WRIST, AND ELBOW OFFICE  VISIT     Name: Savannah Coe      : 1960      MRN: [id number]  Encounter Provider: Kyra Valencia MD  Encounter Date: 7/10/2025   Encounter department: [facility] [place]  :  Assessment & Plan  Hematoma of left hand  Hematoma looks improved upon exam. Decrease size, no erythema or tenderness  Continue with edema glove at night  Discussed with patient using a gloves during activity to protect the hands as she has another contusion on dorsum of hand, a skin abrasion on radial wrist, and a small eschar where a drill impaled the skin of the same hand; also, cat bite/scratch on contralateral arm  Follow up as needed           History of Present Illness   HPI  Chief Complaint   Patient presents with    Left Hand - Follow-up       Savannah Coe is a 65 y.o. female who presents today for follow up evaluation of left hand hematoma. She reports no issues with her hand related to the hematoma. She feels it is getting smaller. She notes it has not been draining. She reports she has been participating in activity without any issues.    Hand dominance: right    REVIEW OF SYSTEMS:  General: no fever, no chills  HEENT:  No loss of hearing or eyesight problems  Eyes:  No red eyes  Respiratory:  No coughing, shortness of breath or wheezing  Cardiovascular:  No chest pain, no palpitations  GI:  Abdomen soft nontender, denies nausea  Endocrine:  No muscle weakness, no frequent urination, no excessive thirst  Urinary:  No dysuria, no incontinence  Musculoskeletal: see HPI and PE  SKIN:  No skin rash, no dry skin  Neurological:  No headaches, no confusion  Psychiatric:  No suicide thoughts, no anxiety, no depression  Review of all other systems is negative    Objective   Ht 5' 7\" (1.702 m)   Wt 65.3 kg (144 lb)   BMI 22.55 kg/m²      General: well developed and well nourished, alert, oriented times 3, and appears comfortable  Psychiatric: " Normal  HEENT: Trachea Midline, No torticollis  Cardiovascular: No discernable arrhythmia  Pulmonary: No wheezing or stridor  Abdomen: No rebound or guarding  Extremities: No peripheral edema  Skin: No masses, erythema, lacerations, fluctation, ulcerations  Neurovascular: Sensation Intact to the Median, Ulnar, Radial Nerve, Motor Intact to the Median, Ulnar, Radial Nerve, and Pulses Intact    Musculoskeletal exam:  Examination of the affected extremity was compared to the unaffected extremity.  Eschars and abrasions and contusion on left hand from recent minor injuries. No swelling or ecchymosis.  No deformity.  Hand and fingers were warm and well-perfused.  Capillary refill was brisk.  Full active range of motion of the elbows, forearms, wrists, and fingers.      Hematoma on dorsum of had  1 cm Length x 7 mm width  Nontender. No erythema, drainage, or tenderness.  Mobile.    STUDIES REVIEWED:  No Studies to review      PROCEDURES PERFORMED:  Procedures  No Procedures performed today      Scribe Attestation      I,:  Miguelangel Davenport am acting as a scribe while in the presence of the attending physician.:       I,:  Kyra Valencia MD personally performed the services described in this documentation    as scribed in my presence.: